# Patient Record
Sex: FEMALE | Race: WHITE | NOT HISPANIC OR LATINO
[De-identification: names, ages, dates, MRNs, and addresses within clinical notes are randomized per-mention and may not be internally consistent; named-entity substitution may affect disease eponyms.]

---

## 2017-11-16 PROBLEM — Z00.00 ENCOUNTER FOR PREVENTIVE HEALTH EXAMINATION: Status: ACTIVE | Noted: 2017-11-16

## 2017-11-20 ENCOUNTER — RECORD ABSTRACTING (OUTPATIENT)
Age: 82
End: 2017-11-20

## 2017-11-20 DIAGNOSIS — R91.1 SOLITARY PULMONARY NODULE: ICD-10-CM

## 2017-11-20 DIAGNOSIS — Z86.79 PERSONAL HISTORY OF OTHER DISEASES OF THE CIRCULATORY SYSTEM: ICD-10-CM

## 2017-11-20 DIAGNOSIS — F32.9 MAJOR DEPRESSIVE DISORDER, SINGLE EPISODE, UNSPECIFIED: ICD-10-CM

## 2017-11-20 DIAGNOSIS — E78.5 HYPERLIPIDEMIA, UNSPECIFIED: ICD-10-CM

## 2017-11-20 DIAGNOSIS — R53.83 OTHER FATIGUE: ICD-10-CM

## 2017-11-20 DIAGNOSIS — F41.9 ANXIETY DISORDER, UNSPECIFIED: ICD-10-CM

## 2017-11-20 DIAGNOSIS — D49.3 NEOPLASM OF UNSPECIFIED BEHAVIOR OF BREAST: ICD-10-CM

## 2017-11-20 DIAGNOSIS — R63.4 ABNORMAL WEIGHT LOSS: ICD-10-CM

## 2017-11-20 PROBLEM — I27.20 PULMONARY HYPERTENSION: Status: ACTIVE | Noted: 2017-11-20

## 2017-11-22 ENCOUNTER — APPOINTMENT (OUTPATIENT)
Dept: CARDIOTHORACIC SURGERY | Facility: CLINIC | Age: 82
End: 2017-11-22
Payer: MEDICARE

## 2017-11-22 ENCOUNTER — APPOINTMENT (OUTPATIENT)
Dept: CARDIOLOGY | Facility: CLINIC | Age: 82
End: 2017-11-22
Payer: MEDICARE

## 2017-11-22 VITALS
BODY MASS INDEX: 20.38 KG/M2 | HEIGHT: 63 IN | OXYGEN SATURATION: 96 % | HEART RATE: 81 BPM | WEIGHT: 115 LBS | SYSTOLIC BLOOD PRESSURE: 150 MMHG | DIASTOLIC BLOOD PRESSURE: 94 MMHG | RESPIRATION RATE: 16 BRPM

## 2017-11-22 DIAGNOSIS — I27.20 PULMONARY HYPERTENSION, UNSPECIFIED: ICD-10-CM

## 2017-11-22 DIAGNOSIS — Z87.891 PERSONAL HISTORY OF NICOTINE DEPENDENCE: ICD-10-CM

## 2017-11-22 PROCEDURE — 99205 OFFICE O/P NEW HI 60 MIN: CPT

## 2017-11-28 ENCOUNTER — FORM ENCOUNTER (OUTPATIENT)
Age: 82
End: 2017-11-28

## 2017-11-29 ENCOUNTER — TRANSCRIPTION ENCOUNTER (OUTPATIENT)
Age: 82
End: 2017-11-29

## 2017-11-29 ENCOUNTER — OUTPATIENT (OUTPATIENT)
Dept: OUTPATIENT SERVICES | Facility: HOSPITAL | Age: 82
LOS: 1 days | End: 2017-11-29
Payer: MEDICARE

## 2017-11-29 VITALS
DIASTOLIC BLOOD PRESSURE: 72 MMHG | RESPIRATION RATE: 16 BRPM | SYSTOLIC BLOOD PRESSURE: 156 MMHG | OXYGEN SATURATION: 98 % | HEART RATE: 71 BPM

## 2017-11-29 VITALS
OXYGEN SATURATION: 98 % | DIASTOLIC BLOOD PRESSURE: 92 MMHG | RESPIRATION RATE: 18 BRPM | SYSTOLIC BLOOD PRESSURE: 140 MMHG | TEMPERATURE: 98 F | HEART RATE: 76 BPM

## 2017-11-29 DIAGNOSIS — I34.8 OTHER NONRHEUMATIC MITRAL VALVE DISORDERS: ICD-10-CM

## 2017-11-29 DIAGNOSIS — Z98.890 OTHER SPECIFIED POSTPROCEDURAL STATES: Chronic | ICD-10-CM

## 2017-11-29 DIAGNOSIS — Z98.49 CATARACT EXTRACTION STATUS, UNSPECIFIED EYE: Chronic | ICD-10-CM

## 2017-11-29 DIAGNOSIS — I34.0 NONRHEUMATIC MITRAL (VALVE) INSUFFICIENCY: ICD-10-CM

## 2017-11-29 LAB
ANION GAP SERPL CALC-SCNC: 12 MMOL/L — SIGNIFICANT CHANGE UP (ref 5–17)
BUN SERPL-MCNC: 11 MG/DL — SIGNIFICANT CHANGE UP (ref 8–20)
CALCIUM SERPL-MCNC: 9.5 MG/DL — SIGNIFICANT CHANGE UP (ref 8.6–10.2)
CHLORIDE SERPL-SCNC: 100 MMOL/L — SIGNIFICANT CHANGE UP (ref 98–107)
CO2 SERPL-SCNC: 27 MMOL/L — SIGNIFICANT CHANGE UP (ref 22–29)
CREAT SERPL-MCNC: 0.66 MG/DL — SIGNIFICANT CHANGE UP (ref 0.5–1.3)
GLUCOSE SERPL-MCNC: 103 MG/DL — SIGNIFICANT CHANGE UP (ref 70–115)
HCT VFR BLD CALC: 48.3 % — HIGH (ref 37–47)
HGB BLD-MCNC: 16.2 G/DL — HIGH (ref 12–16)
INR BLD: 1.03 RATIO — SIGNIFICANT CHANGE UP (ref 0.88–1.16)
MCHC RBC-ENTMCNC: 30.4 PG — SIGNIFICANT CHANGE UP (ref 27–31)
MCHC RBC-ENTMCNC: 33.5 G/DL — SIGNIFICANT CHANGE UP (ref 32–36)
MCV RBC AUTO: 90.6 FL — SIGNIFICANT CHANGE UP (ref 81–99)
PLATELET # BLD AUTO: 132 K/UL — LOW (ref 150–400)
POTASSIUM SERPL-MCNC: 4.2 MMOL/L — SIGNIFICANT CHANGE UP (ref 3.5–5.3)
POTASSIUM SERPL-SCNC: 4.2 MMOL/L — SIGNIFICANT CHANGE UP (ref 3.5–5.3)
PROTHROM AB SERPL-ACNC: 11.3 SEC — SIGNIFICANT CHANGE UP (ref 9.8–12.7)
RBC # BLD: 5.33 M/UL — HIGH (ref 4.4–5.2)
RBC # FLD: 13.9 % — SIGNIFICANT CHANGE UP (ref 11–15.6)
SODIUM SERPL-SCNC: 139 MMOL/L — SIGNIFICANT CHANGE UP (ref 135–145)
WBC # BLD: 8.1 K/UL — SIGNIFICANT CHANGE UP (ref 4.8–10.8)
WBC # FLD AUTO: 8.1 K/UL — SIGNIFICANT CHANGE UP (ref 4.8–10.8)

## 2017-11-29 PROCEDURE — 93312 ECHO TRANSESOPHAGEAL: CPT | Mod: 26

## 2017-11-29 PROCEDURE — 93458 L HRT ARTERY/VENTRICLE ANGIO: CPT | Mod: 26

## 2017-11-29 PROCEDURE — 85027 COMPLETE CBC AUTOMATED: CPT

## 2017-11-29 PROCEDURE — 93320 DOPPLER ECHO COMPLETE: CPT | Mod: 26

## 2017-11-29 PROCEDURE — C1769: CPT

## 2017-11-29 PROCEDURE — 93312 ECHO TRANSESOPHAGEAL: CPT

## 2017-11-29 PROCEDURE — 99152 MOD SED SAME PHYS/QHP 5/>YRS: CPT

## 2017-11-29 PROCEDURE — 36415 COLL VENOUS BLD VENIPUNCTURE: CPT

## 2017-11-29 PROCEDURE — 71010: CPT | Mod: 26

## 2017-11-29 PROCEDURE — 76376 3D RENDER W/INTRP POSTPROCES: CPT | Mod: 26

## 2017-11-29 PROCEDURE — 93005 ELECTROCARDIOGRAM TRACING: CPT

## 2017-11-29 PROCEDURE — 80048 BASIC METABOLIC PNL TOTAL CA: CPT

## 2017-11-29 PROCEDURE — 93458 L HRT ARTERY/VENTRICLE ANGIO: CPT

## 2017-11-29 PROCEDURE — C1887: CPT

## 2017-11-29 PROCEDURE — 71045 X-RAY EXAM CHEST 1 VIEW: CPT

## 2017-11-29 PROCEDURE — 93325 DOPPLER ECHO COLOR FLOW MAPG: CPT | Mod: 26

## 2017-11-29 PROCEDURE — 85610 PROTHROMBIN TIME: CPT

## 2017-11-29 PROCEDURE — 93325 DOPPLER ECHO COLOR FLOW MAPG: CPT

## 2017-11-29 PROCEDURE — 93010 ELECTROCARDIOGRAM REPORT: CPT

## 2017-11-29 PROCEDURE — 93320 DOPPLER ECHO COMPLETE: CPT

## 2017-11-29 PROCEDURE — C1894: CPT

## 2017-11-29 RX ORDER — DIPHENHYDRAMINE HCL 50 MG
25 CAPSULE ORAL ONCE
Qty: 0 | Refills: 0 | Status: COMPLETED | OUTPATIENT
Start: 2017-11-29 | End: 2017-11-29

## 2017-11-29 RX ORDER — DEXAMETHASONE 0.5 MG/5ML
4 ELIXIR ORAL ONCE
Qty: 0 | Refills: 0 | Status: COMPLETED | OUTPATIENT
Start: 2017-11-29 | End: 2017-11-29

## 2017-11-29 RX ORDER — SODIUM CHLORIDE 9 MG/ML
1000 INJECTION INTRAMUSCULAR; INTRAVENOUS; SUBCUTANEOUS
Qty: 0 | Refills: 0 | Status: DISCONTINUED | OUTPATIENT
Start: 2017-11-29 | End: 2017-12-14

## 2017-11-29 RX ADMIN — Medication 4 MILLIGRAM(S): at 11:16

## 2017-11-29 RX ADMIN — Medication 25 MILLIGRAM(S): at 11:16

## 2017-11-29 RX ADMIN — SODIUM CHLORIDE 30 MILLILITER(S): 9 INJECTION INTRAMUSCULAR; INTRAVENOUS; SUBCUTANEOUS at 11:29

## 2017-11-29 NOTE — H&P PST ADULT - PSH
H/O breast surgery    History of bladder surgery    History of cataract surgery  bilateral  History of thyroid surgery

## 2017-11-29 NOTE — DISCHARGE NOTE ADULT - MEDICATION SUMMARY - MEDICATIONS TO TAKE
I will START or STAY ON the medications listed below when I get home from the hospital:    Valium 10 mg oral tablet  -- Indication: For Anxiety    simvastatin 40 mg oral tablet  -- 1 tab(s) by mouth once a day (at bedtime)  -- Indication: For Hyperlipidemia    metoprolol succinate 25 mg oral tablet, extended release  -- 1 tab(s) by mouth once a day  -- Indication: For Hypertension    Lasix 20 mg oral tablet  -- 1 tab(s) by mouth once a day  -- Indication: For Mitral valve regurgitation    HepaGam B injectable solution  -- 0.06 mL/kg injectable every 3 weeks  -- Indication: For Cancer

## 2017-11-29 NOTE — H&P PST ADULT - HISTORY OF PRESENT ILLNESS
83 yo female with h/o thyroid cancer, breast cancer, bladder cancer, all in remission and lung cancer with a right chest wall port in which she is presently on chemo every 21 days.  Patient also with h/o severe MR, hypertension, cataract surgery and COPD for PST for mitral clip with Dr Figueroa.  Patient here today for [facility] and [facility] pre-op testing for mitral clip.  Benadryl and decadron for shellfish allergy.

## 2017-11-29 NOTE — DISCHARGE NOTE ADULT - CARE PROVIDER_API CALL
Frantz Figueroa), Surgery; Thoracic and Cardiac Surgery  [address] [address]  Phone: [phone]  Fax: [phone]

## 2017-11-29 NOTE — DISCHARGE NOTE ADULT - NS AS ACTIVITY OBS
Stairs allowed/No Heavy lifting/straining/Walking-Outdoors allowed/Walking-Indoors allowed/Showering allowed/Do not make important decisions/Do not drive or operate machinery/Sex allowed

## 2017-11-29 NOTE — H&P PST ADULT - PMH
Anxiety    ASHD (arteriosclerotic heart disease)    Breast neoplasm    COPD (chronic obstructive pulmonary disease)    Depression    Fatigue    Hyperlipidemia    Hypertension    Mitral valve regurgitation    Pulmonary hypertension    PVD (peripheral vascular disease)    Tricuspid insufficiency

## 2017-11-29 NOTE — DISCHARGE NOTE ADULT - DISCHARGE DATE
29-Nov-2017 Walking-Indoors allowed/Bathing allowed/No Heavy lifting/straining/Walking-Outdoors allowed/Showering allowed/Driving allowed

## 2017-11-29 NOTE — DISCHARGE NOTE ADULT - PLAN OF CARE
ADl without SOB No heavy lifting, driving, sex, tub baths, swimming, or any activity that submerges the lower half of the body in water for 48 hours.  Limited walking and stairs for 48 hours.    Change the bandaid after 24 hours and every 24 hours after that.  Keep the puncture site dry and covered with a bandaid until a scab forms.    Observe the site frequently.  If bleeding or a large lump (the size of a golf ball or bigger) occurs lie flat, apply continuous direct pressure just above the puncture site for at least 10 minutes, and notify your physician immediately.  If the bleeding cannot be controlled, call 911 immediately for assistance.  Notify your physician of pain, swelling or any drainage.    Notify your physician immediately if coldness, numbness, discoloration or pain in your foot occurs.

## 2017-11-29 NOTE — DISCHARGE NOTE ADULT - PATIENT PORTAL LINK FT
“You can access the FollowHealth Patient Portal, offered by [facility], by registering with the following website: [email or web address]”

## 2017-11-29 NOTE — DISCHARGE NOTE ADULT - CARE PLAN
Principal Discharge DX:	Mitral regurgitation  Goal:	ADl without SOB  Instructions for follow-up, activity and diet:	No heavy lifting, driving, sex, tub baths, swimming, or any activity that submerges the lower half of the body in water for 48 hours.  Limited walking and stairs for 48 hours.    Change the bandaid after 24 hours and every 24 hours after that.  Keep the puncture site dry and covered with a bandaid until a scab forms.    Observe the site frequently.  If bleeding or a large lump (the size of a golf ball or bigger) occurs lie flat, apply continuous direct pressure just above the puncture site for at least 10 minutes, and notify your physician immediately.  If the bleeding cannot be controlled, call 911 immediately for assistance.  Notify your physician of pain, swelling or any drainage.    Notify your physician immediately if coldness, numbness, discoloration or pain in your foot occurs.

## 2017-12-13 ENCOUNTER — OUTPATIENT (OUTPATIENT)
Dept: OUTPATIENT SERVICES | Facility: HOSPITAL | Age: 82
LOS: 1 days | End: 2017-12-13
Payer: MEDICARE

## 2017-12-13 VITALS
HEART RATE: 64 BPM | RESPIRATION RATE: 20 BRPM | HEIGHT: 64 IN | DIASTOLIC BLOOD PRESSURE: 81 MMHG | TEMPERATURE: 97 F | WEIGHT: 116.84 LBS | SYSTOLIC BLOOD PRESSURE: 130 MMHG

## 2017-12-13 DIAGNOSIS — Z98.890 OTHER SPECIFIED POSTPROCEDURAL STATES: Chronic | ICD-10-CM

## 2017-12-13 DIAGNOSIS — Z01.818 ENCOUNTER FOR OTHER PREPROCEDURAL EXAMINATION: ICD-10-CM

## 2017-12-13 DIAGNOSIS — Z98.49 CATARACT EXTRACTION STATUS, UNSPECIFIED EYE: Chronic | ICD-10-CM

## 2017-12-13 DIAGNOSIS — I34.0 NONRHEUMATIC MITRAL (VALVE) INSUFFICIENCY: ICD-10-CM

## 2017-12-13 LAB
ALBUMIN SERPL ELPH-MCNC: 3.9 G/DL — SIGNIFICANT CHANGE UP (ref 3.3–5.2)
ALP SERPL-CCNC: 65 U/L — SIGNIFICANT CHANGE UP (ref 40–120)
ALT FLD-CCNC: 16 U/L — SIGNIFICANT CHANGE UP
ANION GAP SERPL CALC-SCNC: 11 MMOL/L — SIGNIFICANT CHANGE UP (ref 5–17)
APPEARANCE UR: CLEAR — SIGNIFICANT CHANGE UP
APTT BLD: 30.9 SEC — SIGNIFICANT CHANGE UP (ref 27.5–37.4)
AST SERPL-CCNC: 21 U/L — SIGNIFICANT CHANGE UP
BASOPHILS # BLD AUTO: 0 K/UL — SIGNIFICANT CHANGE UP (ref 0–0.2)
BASOPHILS NFR BLD AUTO: 0.5 % — SIGNIFICANT CHANGE UP (ref 0–2)
BILIRUB SERPL-MCNC: 0.4 MG/DL — SIGNIFICANT CHANGE UP (ref 0.4–2)
BILIRUB UR-MCNC: NEGATIVE — SIGNIFICANT CHANGE UP
BLD GP AB SCN SERPL QL: SIGNIFICANT CHANGE UP
BUN SERPL-MCNC: 15 MG/DL — SIGNIFICANT CHANGE UP (ref 8–20)
CALCIUM SERPL-MCNC: 9.4 MG/DL — SIGNIFICANT CHANGE UP (ref 8.6–10.2)
CHLORIDE SERPL-SCNC: 102 MMOL/L — SIGNIFICANT CHANGE UP (ref 98–107)
CO2 SERPL-SCNC: 27 MMOL/L — SIGNIFICANT CHANGE UP (ref 22–29)
COLOR SPEC: YELLOW — SIGNIFICANT CHANGE UP
CREAT SERPL-MCNC: 0.69 MG/DL — SIGNIFICANT CHANGE UP (ref 0.5–1.3)
DIFF PNL FLD: ABNORMAL
EOSINOPHIL # BLD AUTO: 0.2 K/UL — SIGNIFICANT CHANGE UP (ref 0–0.5)
EOSINOPHIL NFR BLD AUTO: 2.6 % — SIGNIFICANT CHANGE UP (ref 0–6)
EPI CELLS # UR: SIGNIFICANT CHANGE UP
GLUCOSE SERPL-MCNC: 95 MG/DL — SIGNIFICANT CHANGE UP (ref 70–115)
GLUCOSE UR QL: NEGATIVE MG/DL — SIGNIFICANT CHANGE UP
HBA1C BLD-MCNC: 5.4 % — SIGNIFICANT CHANGE UP (ref 4–5.6)
HCT VFR BLD CALC: 41.3 % — SIGNIFICANT CHANGE UP (ref 37–47)
HGB BLD-MCNC: 13.7 G/DL — SIGNIFICANT CHANGE UP (ref 12–16)
INR BLD: 1.04 RATIO — SIGNIFICANT CHANGE UP (ref 0.88–1.16)
KETONES UR-MCNC: NEGATIVE — SIGNIFICANT CHANGE UP
LEUKOCYTE ESTERASE UR-ACNC: ABNORMAL
LYMPHOCYTES # BLD AUTO: 2.2 K/UL — SIGNIFICANT CHANGE UP (ref 1–4.8)
LYMPHOCYTES # BLD AUTO: 27.2 % — SIGNIFICANT CHANGE UP (ref 20–55)
MAGNESIUM SERPL-MCNC: 2.1 MG/DL — SIGNIFICANT CHANGE UP (ref 1.6–2.6)
MCHC RBC-ENTMCNC: 30.2 PG — SIGNIFICANT CHANGE UP (ref 27–31)
MCHC RBC-ENTMCNC: 33.2 G/DL — SIGNIFICANT CHANGE UP (ref 32–36)
MCV RBC AUTO: 91 FL — SIGNIFICANT CHANGE UP (ref 81–99)
MONOCYTES # BLD AUTO: 0.4 K/UL — SIGNIFICANT CHANGE UP (ref 0–0.8)
MONOCYTES NFR BLD AUTO: 5.3 % — SIGNIFICANT CHANGE UP (ref 3–10)
MRSA PCR RESULT.: SIGNIFICANT CHANGE UP
NEUTROPHILS # BLD AUTO: 5.2 K/UL — SIGNIFICANT CHANGE UP (ref 1.8–8)
NEUTROPHILS NFR BLD AUTO: 64.3 % — SIGNIFICANT CHANGE UP (ref 37–73)
NITRITE UR-MCNC: NEGATIVE — SIGNIFICANT CHANGE UP
NT-PROBNP SERPL-SCNC: 2166 PG/ML — HIGH (ref 0–300)
PH UR: 7 — SIGNIFICANT CHANGE UP (ref 5–8)
PHOSPHATE SERPL-MCNC: 3.7 MG/DL — SIGNIFICANT CHANGE UP (ref 2.4–4.7)
PLATELET # BLD AUTO: 131 K/UL — LOW (ref 150–400)
POTASSIUM SERPL-MCNC: 4.3 MMOL/L — SIGNIFICANT CHANGE UP (ref 3.5–5.3)
POTASSIUM SERPL-SCNC: 4.3 MMOL/L — SIGNIFICANT CHANGE UP (ref 3.5–5.3)
PREALB SERPL-MCNC: 20 MG/DL — SIGNIFICANT CHANGE UP (ref 18–38)
PROT SERPL-MCNC: 6.6 G/DL — SIGNIFICANT CHANGE UP (ref 6.6–8.7)
PROT UR-MCNC: NEGATIVE MG/DL — SIGNIFICANT CHANGE UP
PROTHROM AB SERPL-ACNC: 11.5 SEC — SIGNIFICANT CHANGE UP (ref 9.8–12.7)
RBC # BLD: 4.54 M/UL — SIGNIFICANT CHANGE UP (ref 4.4–5.2)
RBC # FLD: 13.4 % — SIGNIFICANT CHANGE UP (ref 11–15.6)
RBC CASTS # UR COMP ASSIST: SIGNIFICANT CHANGE UP /HPF (ref 0–4)
S AUREUS DNA NOSE QL NAA+PROBE: DETECTED
SODIUM SERPL-SCNC: 140 MMOL/L — SIGNIFICANT CHANGE UP (ref 135–145)
SP GR SPEC: 1.01 — SIGNIFICANT CHANGE UP (ref 1.01–1.02)
T3 SERPL-MCNC: 101 NG/DL — SIGNIFICANT CHANGE UP (ref 80–200)
T4 AB SER-ACNC: 5.2 UG/DL — SIGNIFICANT CHANGE UP (ref 4.5–12)
TSH SERPL-MCNC: 4.04 UIU/ML — SIGNIFICANT CHANGE UP (ref 0.27–4.2)
TYPE + AB SCN PNL BLD: SIGNIFICANT CHANGE UP
UROBILINOGEN FLD QL: NEGATIVE MG/DL — SIGNIFICANT CHANGE UP
WBC # BLD: 8.1 K/UL — SIGNIFICANT CHANGE UP (ref 4.8–10.8)
WBC # FLD AUTO: 8.1 K/UL — SIGNIFICANT CHANGE UP (ref 4.8–10.8)
WBC UR QL: SIGNIFICANT CHANGE UP

## 2017-12-13 PROCEDURE — 71020: CPT | Mod: 26

## 2017-12-13 PROCEDURE — 93010 ELECTROCARDIOGRAM REPORT: CPT

## 2017-12-13 RX ORDER — FUROSEMIDE 40 MG
1 TABLET ORAL
Qty: 0 | Refills: 0 | COMMUNITY

## 2017-12-13 NOTE — H&P PST ADULT - HISTORY OF PRESENT ILLNESS
85 yo female with h/o thyroid cancer, breast cancer, bladder cancer, all in remission and lung cancer with a right chest wall port in which she is presently on chemo every 21 days.  Patient also with h/o severe MR, hypertension, cataract surgery and COPD for PST for mitral clip with Dr Figueroa.

## 2017-12-13 NOTE — H&P PST ADULT - PSH
H/O breast surgery    History of bladder surgery    History of cataract surgery  bilateral  History of thyroid surgery    S/P Mohs surgery for basal cell carcinoma

## 2017-12-13 NOTE — H&P PST ADULT - PMH
Anxiety    ASHD (arteriosclerotic heart disease)    Basal cell carcinoma    Bladder cancer    Breast neoplasm    COPD (chronic obstructive pulmonary disease)    Fatigue    Glaucoma    Hyperlipidemia    Hypertension    Lung cancer    Mitral valve regurgitation    Pulmonary hypertension    PVD (peripheral vascular disease)    Thyroid ca    Tricuspid insufficiency

## 2017-12-13 NOTE — H&P PST ADULT - NSANTHOSAYNRD_GEN_A_CORE
No. ARLENE screening performed.  STOP BANG Legend: 0-2 = LOW Risk; 3-4 = INTERMEDIATE Risk; 5-8 = HIGH Risk

## 2017-12-13 NOTE — H&P PST ADULT - ASSESSMENT
83 yo female with h/o thyroid cancer, breast cancer, bladder cancer, all in remission and lung cancer with a right chest wall port in which she is presently on chemo every 21 days.  Patient also with h/o severe MR, hypertension, cataract surgery and COPD for PST for mitral clip with Dr Figueroa.

## 2017-12-13 NOTE — PATIENT PROFILE ADULT. - LEARNING ASSESSMENT (PATIENT) ADDITIONAL COMMENTS
Instructed pt on pre-op instructions, tips for safer surgery, pain management scale, pre-surgical infection prevention instructions, MRSA/MSSA instructions, and verbalized understanding of all.

## 2017-12-13 NOTE — PATIENT PROFILE ADULT. - VISION (WITH CORRECTIVE LENSES IF THE PATIENT USUALLY WEARS THEM):
Partially impaired: cannot see medication labels or newsprint, but can see obstacles in path, and the surrounding layout; can count fingers at arm's length/glasses - bifocals

## 2017-12-14 LAB
CULTURE RESULTS: SIGNIFICANT CHANGE UP
SPECIMEN SOURCE: SIGNIFICANT CHANGE UP

## 2017-12-19 PROCEDURE — 93005 ELECTROCARDIOGRAM TRACING: CPT

## 2017-12-19 PROCEDURE — 85610 PROTHROMBIN TIME: CPT

## 2017-12-19 PROCEDURE — 86900 BLOOD TYPING SEROLOGIC ABO: CPT

## 2017-12-19 PROCEDURE — 84134 ASSAY OF PREALBUMIN: CPT

## 2017-12-19 PROCEDURE — 83036 HEMOGLOBIN GLYCOSYLATED A1C: CPT

## 2017-12-19 PROCEDURE — 87641 MR-STAPH DNA AMP PROBE: CPT

## 2017-12-19 PROCEDURE — 83880 ASSAY OF NATRIURETIC PEPTIDE: CPT

## 2017-12-19 PROCEDURE — 71046 X-RAY EXAM CHEST 2 VIEWS: CPT

## 2017-12-19 PROCEDURE — 83735 ASSAY OF MAGNESIUM: CPT

## 2017-12-19 PROCEDURE — 84480 ASSAY TRIIODOTHYRONINE (T3): CPT

## 2017-12-19 PROCEDURE — G0463: CPT

## 2017-12-19 PROCEDURE — 87086 URINE CULTURE/COLONY COUNT: CPT

## 2017-12-19 PROCEDURE — 84443 ASSAY THYROID STIM HORMONE: CPT

## 2017-12-19 PROCEDURE — 36415 COLL VENOUS BLD VENIPUNCTURE: CPT

## 2017-12-19 PROCEDURE — 80053 COMPREHEN METABOLIC PANEL: CPT

## 2017-12-19 PROCEDURE — 84100 ASSAY OF PHOSPHORUS: CPT

## 2017-12-19 PROCEDURE — 86901 BLOOD TYPING SEROLOGIC RH(D): CPT

## 2017-12-19 PROCEDURE — 87640 STAPH A DNA AMP PROBE: CPT

## 2017-12-19 PROCEDURE — 85730 THROMBOPLASTIN TIME PARTIAL: CPT

## 2017-12-19 PROCEDURE — 86920 COMPATIBILITY TEST SPIN: CPT

## 2017-12-19 PROCEDURE — 84436 ASSAY OF TOTAL THYROXINE: CPT

## 2017-12-19 PROCEDURE — 86850 RBC ANTIBODY SCREEN: CPT

## 2017-12-19 PROCEDURE — 85027 COMPLETE CBC AUTOMATED: CPT

## 2017-12-19 PROCEDURE — 81001 URINALYSIS AUTO W/SCOPE: CPT

## 2017-12-20 ENCOUNTER — INPATIENT (INPATIENT)
Facility: HOSPITAL | Age: 82
LOS: 0 days | Discharge: HOME | DRG: 229 | End: 2017-12-21
Attending: THORACIC SURGERY (CARDIOTHORACIC VASCULAR SURGERY) | Admitting: THORACIC SURGERY (CARDIOTHORACIC VASCULAR SURGERY)
Payer: MEDICARE

## 2017-12-20 ENCOUNTER — APPOINTMENT (OUTPATIENT)
Dept: CARDIOTHORACIC SURGERY | Facility: HOSPITAL | Age: 82
End: 2017-12-20
Payer: MEDICARE

## 2017-12-20 VITALS
RESPIRATION RATE: 16 BRPM | WEIGHT: 115.08 LBS | OXYGEN SATURATION: 100 % | TEMPERATURE: 98 F | DIASTOLIC BLOOD PRESSURE: 87 MMHG | SYSTOLIC BLOOD PRESSURE: 142 MMHG | HEIGHT: 63 IN | HEART RATE: 73 BPM

## 2017-12-20 DIAGNOSIS — Z98.890 OTHER SPECIFIED POSTPROCEDURAL STATES: Chronic | ICD-10-CM

## 2017-12-20 DIAGNOSIS — Z98.49 CATARACT EXTRACTION STATUS, UNSPECIFIED EYE: Chronic | ICD-10-CM

## 2017-12-20 DIAGNOSIS — I34.0 NONRHEUMATIC MITRAL (VALVE) INSUFFICIENCY: ICD-10-CM

## 2017-12-20 LAB
ALBUMIN SERPL ELPH-MCNC: 3.5 G/DL — SIGNIFICANT CHANGE UP (ref 3.3–5.2)
ALP SERPL-CCNC: 66 U/L — SIGNIFICANT CHANGE UP (ref 40–120)
ALT FLD-CCNC: 13 U/L — SIGNIFICANT CHANGE UP
ANION GAP SERPL CALC-SCNC: 14 MMOL/L — SIGNIFICANT CHANGE UP (ref 5–17)
APTT BLD: 32.9 SEC — SIGNIFICANT CHANGE UP (ref 27.5–37.4)
AST SERPL-CCNC: 20 U/L — SIGNIFICANT CHANGE UP
BILIRUB SERPL-MCNC: 0.5 MG/DL — SIGNIFICANT CHANGE UP (ref 0.4–2)
BLD GP AB SCN SERPL QL: SIGNIFICANT CHANGE UP
BUN SERPL-MCNC: 12 MG/DL — SIGNIFICANT CHANGE UP (ref 8–20)
CALCIUM SERPL-MCNC: 8.5 MG/DL — LOW (ref 8.6–10.2)
CHLORIDE SERPL-SCNC: 102 MMOL/L — SIGNIFICANT CHANGE UP (ref 98–107)
CK SERPL-CCNC: 36 U/L — SIGNIFICANT CHANGE UP (ref 25–170)
CO2 SERPL-SCNC: 24 MMOL/L — SIGNIFICANT CHANGE UP (ref 22–29)
CREAT SERPL-MCNC: 0.51 MG/DL — SIGNIFICANT CHANGE UP (ref 0.5–1.3)
GAS PNL BLDA: SIGNIFICANT CHANGE UP
GLUCOSE BLDC GLUCOMTR-MCNC: 93 MG/DL — SIGNIFICANT CHANGE UP (ref 70–99)
GLUCOSE SERPL-MCNC: 127 MG/DL — HIGH (ref 70–115)
HCT VFR BLD CALC: 39.2 % — SIGNIFICANT CHANGE UP (ref 37–47)
HGB BLD-MCNC: 13.3 G/DL — SIGNIFICANT CHANGE UP (ref 12–16)
INR BLD: 1.12 RATIO — SIGNIFICANT CHANGE UP (ref 0.88–1.16)
MAGNESIUM SERPL-MCNC: 1.8 MG/DL — SIGNIFICANT CHANGE UP (ref 1.6–2.6)
MCHC RBC-ENTMCNC: 30.5 PG — SIGNIFICANT CHANGE UP (ref 27–31)
MCHC RBC-ENTMCNC: 33.9 G/DL — SIGNIFICANT CHANGE UP (ref 32–36)
MCV RBC AUTO: 89.9 FL — SIGNIFICANT CHANGE UP (ref 81–99)
PLATELET # BLD AUTO: 105 K/UL — LOW (ref 150–400)
POTASSIUM SERPL-MCNC: 3.6 MMOL/L — SIGNIFICANT CHANGE UP (ref 3.5–5.3)
POTASSIUM SERPL-SCNC: 3.6 MMOL/L — SIGNIFICANT CHANGE UP (ref 3.5–5.3)
PROT SERPL-MCNC: 6.1 G/DL — LOW (ref 6.6–8.7)
PROTHROM AB SERPL-ACNC: 12.3 SEC — SIGNIFICANT CHANGE UP (ref 9.8–12.7)
RBC # BLD: 4.36 M/UL — LOW (ref 4.4–5.2)
RBC # FLD: 13.4 % — SIGNIFICANT CHANGE UP (ref 11–15.6)
SODIUM SERPL-SCNC: 140 MMOL/L — SIGNIFICANT CHANGE UP (ref 135–145)
TROPONIN T SERPL-MCNC: 0.04 NG/ML — SIGNIFICANT CHANGE UP (ref 0–0.06)
TYPE + AB SCN PNL BLD: SIGNIFICANT CHANGE UP
WBC # BLD: 6.6 K/UL — SIGNIFICANT CHANGE UP (ref 4.8–10.8)
WBC # FLD AUTO: 6.6 K/UL — SIGNIFICANT CHANGE UP (ref 4.8–10.8)

## 2017-12-20 PROCEDURE — 33418 REPAIR TCAT MITRAL VALVE: CPT | Mod: 62,Q0

## 2017-12-20 PROCEDURE — 93010 ELECTROCARDIOGRAM REPORT: CPT

## 2017-12-20 PROCEDURE — 33419 REPAIR TCAT MITRAL VALVE: CPT | Mod: 62,Q0

## 2017-12-20 PROCEDURE — 33419 REPAIR TCAT MITRAL VALVE: CPT | Mod: AS,Q0

## 2017-12-20 PROCEDURE — 93355 ECHO TRANSESOPHAGEAL (TEE): CPT

## 2017-12-20 RX ORDER — METOPROLOL TARTRATE 50 MG
25 TABLET ORAL DAILY
Qty: 0 | Refills: 0 | Status: DISCONTINUED | OUTPATIENT
Start: 2017-12-21 | End: 2017-12-21

## 2017-12-20 RX ORDER — SIMVASTATIN 20 MG/1
40 TABLET, FILM COATED ORAL AT BEDTIME
Qty: 0 | Refills: 0 | Status: DISCONTINUED | OUTPATIENT
Start: 2017-12-20 | End: 2017-12-21

## 2017-12-20 RX ORDER — SODIUM CHLORIDE 9 MG/ML
1000 INJECTION INTRAMUSCULAR; INTRAVENOUS; SUBCUTANEOUS
Qty: 0 | Refills: 0 | Status: DISCONTINUED | OUTPATIENT
Start: 2017-12-20 | End: 2017-12-21

## 2017-12-20 RX ORDER — ASPIRIN/CALCIUM CARB/MAGNESIUM 324 MG
325 TABLET ORAL DAILY
Qty: 0 | Refills: 0 | Status: DISCONTINUED | OUTPATIENT
Start: 2017-12-21 | End: 2017-12-21

## 2017-12-20 RX ORDER — BRIMONIDINE TARTRATE 2 MG/MG
1 SOLUTION/ DROPS OPHTHALMIC
Qty: 0 | Refills: 0 | Status: DISCONTINUED | OUTPATIENT
Start: 2017-12-20 | End: 2017-12-21

## 2017-12-20 RX ORDER — CEFUROXIME AXETIL 250 MG
1500 TABLET ORAL EVERY 8 HOURS
Qty: 0 | Refills: 0 | Status: DISCONTINUED | OUTPATIENT
Start: 2017-12-20 | End: 2017-12-21

## 2017-12-20 RX ORDER — POTASSIUM CHLORIDE 20 MEQ
10 PACKET (EA) ORAL
Qty: 0 | Refills: 0 | Status: COMPLETED | OUTPATIENT
Start: 2017-12-20 | End: 2017-12-20

## 2017-12-20 RX ORDER — LATANOPROST 0.05 MG/ML
1 SOLUTION/ DROPS OPHTHALMIC; TOPICAL AT BEDTIME
Qty: 0 | Refills: 0 | Status: DISCONTINUED | OUTPATIENT
Start: 2017-12-20 | End: 2017-12-21

## 2017-12-20 RX ORDER — SODIUM CHLORIDE 9 MG/ML
3 INJECTION INTRAMUSCULAR; INTRAVENOUS; SUBCUTANEOUS EVERY 8 HOURS
Qty: 0 | Refills: 0 | Status: DISCONTINUED | OUTPATIENT
Start: 2017-12-20 | End: 2017-12-20

## 2017-12-20 RX ORDER — CEFUROXIME AXETIL 250 MG
1500 TABLET ORAL ONCE
Qty: 0 | Refills: 0 | Status: DISCONTINUED | OUTPATIENT
Start: 2017-12-20 | End: 2017-12-20

## 2017-12-20 RX ORDER — ASPIRIN/CALCIUM CARB/MAGNESIUM 324 MG
325 TABLET ORAL ONCE
Qty: 0 | Refills: 0 | Status: COMPLETED | OUTPATIENT
Start: 2017-12-20 | End: 2017-12-20

## 2017-12-20 RX ORDER — POTASSIUM CHLORIDE 20 MEQ
40 PACKET (EA) ORAL ONCE
Qty: 0 | Refills: 0 | Status: COMPLETED | OUTPATIENT
Start: 2017-12-20 | End: 2017-12-20

## 2017-12-20 RX ORDER — FUROSEMIDE 40 MG
40 TABLET ORAL DAILY
Qty: 0 | Refills: 0 | Status: DISCONTINUED | OUTPATIENT
Start: 2017-12-21 | End: 2017-12-21

## 2017-12-20 RX ORDER — CHLORHEXIDINE GLUCONATE 213 G/1000ML
5 SOLUTION TOPICAL EVERY 4 HOURS
Qty: 0 | Refills: 0 | Status: DISCONTINUED | OUTPATIENT
Start: 2017-12-20 | End: 2017-12-20

## 2017-12-20 RX ORDER — SODIUM CHLORIDE 9 MG/ML
1000 INJECTION INTRAMUSCULAR; INTRAVENOUS; SUBCUTANEOUS
Qty: 0 | Refills: 0 | Status: DISCONTINUED | OUTPATIENT
Start: 2017-12-20 | End: 2017-12-20

## 2017-12-20 RX ORDER — MAGNESIUM SULFATE 500 MG/ML
2 VIAL (ML) INJECTION ONCE
Qty: 0 | Refills: 0 | Status: COMPLETED | OUTPATIENT
Start: 2017-12-20 | End: 2017-12-20

## 2017-12-20 RX ADMIN — LATANOPROST 1 DROP(S): 0.05 SOLUTION/ DROPS OPHTHALMIC; TOPICAL at 21:52

## 2017-12-20 RX ADMIN — Medication 100 MILLIGRAM(S): at 20:37

## 2017-12-20 RX ADMIN — Medication 100 MILLIEQUIVALENT(S): at 21:58

## 2017-12-20 RX ADMIN — SIMVASTATIN 40 MILLIGRAM(S): 20 TABLET, FILM COATED ORAL at 21:52

## 2017-12-20 RX ADMIN — Medication 50 GRAM(S): at 18:39

## 2017-12-20 RX ADMIN — BRIMONIDINE TARTRATE 1 DROP(S): 2 SOLUTION/ DROPS OPHTHALMIC at 18:32

## 2017-12-20 RX ADMIN — Medication 100 MILLIEQUIVALENT(S): at 18:45

## 2017-12-20 RX ADMIN — Medication 325 MILLIGRAM(S): at 21:52

## 2017-12-20 RX ADMIN — Medication 40 MILLIEQUIVALENT(S): at 19:49

## 2017-12-20 RX ADMIN — Medication 100 MILLIEQUIVALENT(S): at 20:37

## 2017-12-20 NOTE — BRIEF OPERATIVE NOTE - PROCEDURE
<<-----Click on this checkbox to enter Procedure Percutaneous mitral valve repair with leaflet clip implant  12/20/2017  Percutaneous Mitral Valve Repair Utilizing the Mitral Clip via Right Common Femoral Vein (NCT# [id number]) (STS/ACC TVT Registry Patient ID# [id number])  Active  [id number]

## 2017-12-20 NOTE — PROGRESS NOTE ADULT - SUBJECTIVE AND OBJECTIVE BOX
Patient is enrolled in the STS/ACC TVT Registry Mitral Module (TMVR) clinical trials number [id number]

## 2017-12-20 NOTE — PROGRESS NOTE ADULT - SUBJECTIVE AND OBJECTIVE BOX
[employer] Percutaneous Mitral Valve Repair Utilizing the Mitral Clip via Right Common Femoral Vein.  NCT# [id number], STS/ACC TVT Registry Patient ID# [id number].

## 2017-12-20 NOTE — BRIEF OPERATIVE NOTE - POST-OP DX
Chronic diastolic CHF (congestive heart failure), NYHA class 4  12/20/2017    Active  Lm Wise  Severe mitral regurgitation  12/20/2017    Active  Lm Wise

## 2017-12-21 VITALS
DIASTOLIC BLOOD PRESSURE: 65 MMHG | OXYGEN SATURATION: 98 % | HEART RATE: 72 BPM | RESPIRATION RATE: 25 BRPM | SYSTOLIC BLOOD PRESSURE: 144 MMHG

## 2017-12-21 DIAGNOSIS — H40.9 UNSPECIFIED GLAUCOMA: ICD-10-CM

## 2017-12-21 DIAGNOSIS — I50.32 CHRONIC DIASTOLIC (CONGESTIVE) HEART FAILURE: ICD-10-CM

## 2017-12-21 DIAGNOSIS — E78.5 HYPERLIPIDEMIA, UNSPECIFIED: ICD-10-CM

## 2017-12-21 LAB
ANION GAP SERPL CALC-SCNC: 13 MMOL/L — SIGNIFICANT CHANGE UP (ref 5–17)
BUN SERPL-MCNC: 12 MG/DL — SIGNIFICANT CHANGE UP (ref 8–20)
CALCIUM SERPL-MCNC: 8.4 MG/DL — LOW (ref 8.6–10.2)
CHLORIDE SERPL-SCNC: 104 MMOL/L — SIGNIFICANT CHANGE UP (ref 98–107)
CK SERPL-CCNC: 33 U/L — SIGNIFICANT CHANGE UP (ref 25–170)
CO2 SERPL-SCNC: 22 MMOL/L — SIGNIFICANT CHANGE UP (ref 22–29)
CREAT SERPL-MCNC: 0.52 MG/DL — SIGNIFICANT CHANGE UP (ref 0.5–1.3)
GLUCOSE SERPL-MCNC: 112 MG/DL — SIGNIFICANT CHANGE UP (ref 70–115)
HCT VFR BLD CALC: 38.1 % — SIGNIFICANT CHANGE UP (ref 37–47)
HGB BLD-MCNC: 12.9 G/DL — SIGNIFICANT CHANGE UP (ref 12–16)
MAGNESIUM SERPL-MCNC: 2.2 MG/DL — SIGNIFICANT CHANGE UP (ref 1.8–2.6)
MCHC RBC-ENTMCNC: 30.4 PG — SIGNIFICANT CHANGE UP (ref 27–31)
MCHC RBC-ENTMCNC: 33.9 G/DL — SIGNIFICANT CHANGE UP (ref 32–36)
MCV RBC AUTO: 89.9 FL — SIGNIFICANT CHANGE UP (ref 81–99)
PLATELET # BLD AUTO: 104 K/UL — LOW (ref 150–400)
POTASSIUM SERPL-MCNC: 4.3 MMOL/L — SIGNIFICANT CHANGE UP (ref 3.5–5.3)
POTASSIUM SERPL-SCNC: 4.3 MMOL/L — SIGNIFICANT CHANGE UP (ref 3.5–5.3)
RBC # BLD: 4.24 M/UL — LOW (ref 4.4–5.2)
RBC # FLD: 13.5 % — SIGNIFICANT CHANGE UP (ref 11–15.6)
SODIUM SERPL-SCNC: 139 MMOL/L — SIGNIFICANT CHANGE UP (ref 135–145)
TROPONIN T SERPL-MCNC: 0.03 NG/ML — SIGNIFICANT CHANGE UP (ref 0–0.06)
WBC # BLD: 8.3 K/UL — SIGNIFICANT CHANGE UP (ref 4.8–10.8)
WBC # FLD AUTO: 8.3 K/UL — SIGNIFICANT CHANGE UP (ref 4.8–10.8)

## 2017-12-21 PROCEDURE — 71010: CPT | Mod: 26

## 2017-12-21 PROCEDURE — 86901 BLOOD TYPING SEROLOGIC RH(D): CPT

## 2017-12-21 PROCEDURE — 85730 THROMBOPLASTIN TIME PARTIAL: CPT

## 2017-12-21 PROCEDURE — 80048 BASIC METABOLIC PNL TOTAL CA: CPT

## 2017-12-21 PROCEDURE — 97163 PT EVAL HIGH COMPLEX 45 MIN: CPT

## 2017-12-21 PROCEDURE — C1889: CPT

## 2017-12-21 PROCEDURE — 82550 ASSAY OF CK (CPK): CPT

## 2017-12-21 PROCEDURE — 84132 ASSAY OF SERUM POTASSIUM: CPT

## 2017-12-21 PROCEDURE — 36415 COLL VENOUS BLD VENIPUNCTURE: CPT

## 2017-12-21 PROCEDURE — 82803 BLOOD GASES ANY COMBINATION: CPT

## 2017-12-21 PROCEDURE — 82330 ASSAY OF CALCIUM: CPT

## 2017-12-21 PROCEDURE — 86850 RBC ANTIBODY SCREEN: CPT

## 2017-12-21 PROCEDURE — 93005 ELECTROCARDIOGRAM TRACING: CPT

## 2017-12-21 PROCEDURE — 80053 COMPREHEN METABOLIC PANEL: CPT

## 2017-12-21 PROCEDURE — 93312 ECHO TRANSESOPHAGEAL: CPT

## 2017-12-21 PROCEDURE — 93306 TTE W/DOPPLER COMPLETE: CPT | Mod: 26

## 2017-12-21 PROCEDURE — 71045 X-RAY EXAM CHEST 1 VIEW: CPT

## 2017-12-21 PROCEDURE — 83605 ASSAY OF LACTIC ACID: CPT

## 2017-12-21 PROCEDURE — 93306 TTE W/DOPPLER COMPLETE: CPT

## 2017-12-21 PROCEDURE — 83735 ASSAY OF MAGNESIUM: CPT

## 2017-12-21 PROCEDURE — 85027 COMPLETE CBC AUTOMATED: CPT

## 2017-12-21 PROCEDURE — 82435 ASSAY OF BLOOD CHLORIDE: CPT

## 2017-12-21 PROCEDURE — 85610 PROTHROMBIN TIME: CPT

## 2017-12-21 PROCEDURE — 86900 BLOOD TYPING SEROLOGIC ABO: CPT

## 2017-12-21 PROCEDURE — 84484 ASSAY OF TROPONIN QUANT: CPT

## 2017-12-21 PROCEDURE — 84295 ASSAY OF SERUM SODIUM: CPT

## 2017-12-21 PROCEDURE — 93325 DOPPLER ECHO COLOR FLOW MAPG: CPT

## 2017-12-21 PROCEDURE — 93010 ELECTROCARDIOGRAM REPORT: CPT

## 2017-12-21 PROCEDURE — 86920 COMPATIBILITY TEST SPIN: CPT

## 2017-12-21 PROCEDURE — 82947 ASSAY GLUCOSE BLOOD QUANT: CPT

## 2017-12-21 PROCEDURE — 82962 GLUCOSE BLOOD TEST: CPT

## 2017-12-21 PROCEDURE — 93320 DOPPLER ECHO COMPLETE: CPT

## 2017-12-21 PROCEDURE — 85014 HEMATOCRIT: CPT

## 2017-12-21 RX ORDER — ASPIRIN/CALCIUM CARB/MAGNESIUM 324 MG
1 TABLET ORAL
Qty: 90 | Refills: 0
Start: 2017-12-21 | End: 2018-03-20

## 2017-12-21 RX ADMIN — Medication 40 MILLIGRAM(S): at 06:49

## 2017-12-21 RX ADMIN — BRIMONIDINE TARTRATE 1 DROP(S): 2 SOLUTION/ DROPS OPHTHALMIC at 17:48

## 2017-12-21 RX ADMIN — Medication 100 MILLIGRAM(S): at 11:43

## 2017-12-21 RX ADMIN — Medication 100 MILLIGRAM(S): at 04:00

## 2017-12-21 RX ADMIN — BRIMONIDINE TARTRATE 1 DROP(S): 2 SOLUTION/ DROPS OPHTHALMIC at 06:49

## 2017-12-21 RX ADMIN — Medication 325 MILLIGRAM(S): at 11:43

## 2017-12-21 RX ADMIN — Medication 25 MILLIGRAM(S): at 08:25

## 2017-12-21 NOTE — DISCHARGE NOTE ADULT - OTHER SIGNIFICANT FINDINGS
Transthoracic Echocardiogram:   1. Left ventricular ejection fraction, by visual estimation, is 45%.   2. Mildly to moderately decreased global left ventricular systolic   function.   3. Spectral Doppler shows pseudonormal pattern of left ventricular   myocardial filling   4. Normal right ventricular size and function.   5. Severely enlarged left atrium.   6. Severely dilated right atrium.   7. Sclerotic aortic valve with normal opening.   8. Mild aortic regurgitation.   9. Patient is s/p mitral clip with at least moderate anteriorly directed   regurgitation.  10. Mitral valve mean gradient is 4.1 mmHg consistent with mild mitral   stenosis.  11. Moderate mitral annular calcification.  12. Mild-moderate tricuspid regurgitation.  13. There is no evidence of pericardial effusion.

## 2017-12-21 NOTE — DISCHARGE NOTE ADULT - CARE PROVIDER_API CALL
Frantz Figueroa), Surgery; Thoracic and Cardiac Surgery  [address] [address]  Phone: [phone]  Fax: [phone]

## 2017-12-21 NOTE — DISCHARGE NOTE ADULT - PATIENT PORTAL LINK FT
“You can access the FollowHealth Patient Portal, offered by [facility], by registering with the following website: [email or web address]”

## 2017-12-21 NOTE — PROGRESS NOTE ADULT - ASSESSMENT
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

## 2017-12-21 NOTE — PROGRESS NOTE ADULT - PROBLEM SELECTOR PLAN 1
s/p mitral clip  neurologically intact  hemodynamically stable  continue aspirin (started last night)  extubated yesterday in OR, oxygenating well, C+DBE/IS instructed and encouraged  dysphagia screen passed, diet as tolerated  replete electrolytes PRN   OOB/ambulate as tolerated and PT consult this morning  acetaminophen PRN for analgesia  if TTE without effusion and pt remains stable she can likely be d/c'd today  above plan of care to be discussed with CT surgical team on am rounds  continue post-op GI, DVT, abx prophylaxis s/p mitral clip  neurologically intact  hemodynamically stable  continue aspirin (started last night)  d/c right groin stitch prior to discharge  extubated yesterday in OR, oxygenating well, C+DBE/IS instructed and encouraged  dysphagia screen passed, diet as tolerated  replete electrolytes PRN   OOB/ambulate as tolerated and PT consult this morning  acetaminophen PRN for analgesia  if TTE without effusion and pt remains stable she can likely be d/c'd today  above plan of care to be discussed with CT surgical team on am rounds  continue post-op GI, DVT, abx prophylaxis

## 2017-12-21 NOTE — PROGRESS NOTE ADULT - SUBJECTIVE AND OBJECTIVE BOX
Subjective: right groin is sore when touched. Denies CP, SOB, palpitations, dizziness, N/V, other c/o.    T(C): 36.4 (12-20-17 @ 19:00), Max: 36.9 (12-20-17 @ 11:21)  HR: 51 (12-21-17 @ 00:00) (50 - 73), SB  BP: 142/87 (12-20-17 @ 11:21) (142/87 - 142/87)  ABP: 122/49 (12-21-17 @ 00:00) (122/49 - 165/63)  ABP(mean): 74 (12-21-17 @ 00:00) (74 - 105)  RR: 15 (12-21-17 @ 00:00) (0 - 24)  SpO2: 95% (12-21-17 @ 00:00) (95% - 100%) on room air      I&O's Detail    20 Dec 2017 07:01  -  21 Dec 2017 00:15  --------------------------------------------------------  IN:    Oral Fluid: 480 mL    sodium chloride 0.9%.: 25 mL    Solution: 100 mL    Solution: 50 mL    Solution: 50 mL  Total IN: 705 mL    OUT:    Voided: 550 mL  Total OUT: 550 mL    Total NET: 155 mL      12-20    140  |  102  |  12.0  ----------------------------<  127<H>  3.6   |  24.0  |  0.51    Ca    8.5<L>      20 Dec 2017 17:21  Mg     1.8     12-20  TPro  6.1<L>  /  Alb  3.5  /  TBili  0.5  /  DBili  x   /  AST  20  /  ALT  13  /  AlkPhos  66  12-20                       13.3   6.6   )-----------( 105      ( 20 Dec 2017 17:21 )             39.2     PT/INR - ( 20 Dec 2017 17:21 )   PT: 12.3 sec;   INR: 1.12 ratio    PTT - ( 20 Dec 2017 17:21 )  PTT:32.9 sec    ABG - ( 20 Dec 2017 17:03 )  pH: 7.37  /  pCO2: 43    /  pO2: 167   / HCO3: 24    / Base Excess: -0.8  /  SaO2: 100       CARDIAC MARKERS ( 20 Dec 2017 17:21 )  CK36 U/L / CKMBx     / Troponin T0.04 ng/mL /    POCT Blood Glucose.: 93 mg/dL (20 Dec 2017 12:02)      12/20/17 CXR (my read): s/p mitral clip, no PTX or opacity, mild cephalization, right port in SVC  12/21/17 CXR: pending    MEDICATIONS  (STANDING):  aspirin enteric coated 325 milliGRAM(s) Oral daily  brimonidine 0.2% Ophthalmic Solution 1 Drop(s) Both EYES two times a day  cefuroxime  IVPB 1500 milliGRAM(s) IV Intermittent every 8 hours  furosemide    Tablet 40 milliGRAM(s) Oral daily  latanoprost 0.005% Ophthalmic Solution 1 Drop(s) Both EYES at bedtime  metoprolol succinate ER 25 milliGRAM(s) Oral daily  simvastatin 40 milliGRAM(s) Oral at bedtime  sodium chloride 0.9%. 1000 milliLiter(s) (5 mL/Hr) IV Continuous <Continuous>      Physical Exam  Neuro: A+O x 3, non-focal, speech clear and intact  Pulm: CTA, good air entry, equal bilaterally, no accessory muscle use noted  Chest: right sided port  CV: bradycardic, regular rhythm, +S1S2  Abd: soft, NT, ND, +BS  Ext: SCHMIDT x 4, no edema, BLE w/ intact distal motor/neuro/circ  Inc: right groin with dsg C/D/I/soft/TTP

## 2017-12-21 NOTE — DISCHARGE NOTE ADULT - FINDINGS/TREATMENT
Summary:   1. Left ventricular ejection fraction, by visual estimation, is 45%.   2. Mildly to moderately decreased global left ventricular systolic   function.   3. Spectral Doppler shows pseudonormal pattern of left ventricular   myocardial filling   4. Normal right ventricular size and function.   5. Severely enlarged left atrium.   6. Severely dilated right atrium.   7. Sclerotic aortic valve with normal opening.   8. Mild aortic regurgitation.   9. Patient is s/p mitral clip with at least moderate anteriorly directed   regurgitation.  10. Mitral valve mean gradient is 4.1 mmHg consistent with mild mitral   stenosis.  11. Moderate mitral annular calcification.  12. Mild-moderate tricuspid regurgitation.  13. There is no evidence of pericardial effusion.

## 2017-12-21 NOTE — DISCHARGE NOTE ADULT - HOSPITAL COURSE
12/20 no intaop issues, extubated in OR, mild residual MR with mean gradient 3 mmHg     Summary:   1. Left ventricular ejection fraction, by visual estimation, is 45%.   2. Mildly to moderately decreased global left ventricular systolic   function.   3. Spectral Doppler shows pseudonormal pattern of left ventricular   myocardial filling   4. Normal right ventricular size and function.   5. Severely enlarged left atrium.   6. Severely dilated right atrium.   7. Sclerotic aortic valve with normal opening.   8. Mild aortic regurgitation.   9. Patient is s/p mitral clip with at least moderate anteriorly directed   regurgitation.  10. Mitral valve mean gradient is 4.1 mmHg consistent with mild mitral   stenosis.  11. Moderate mitral annular calcification.  12. Mild-moderate tricuspid regurgitation.  13. There is no evidence of pericardial effusion.

## 2017-12-21 NOTE — DISCHARGE NOTE ADULT - PLAN OF CARE
Recovery From Procedure 1. Take ALL of your medications as ordered. Fill your prescriptions the day you are discharged and take according to the schedule you were given. Continue to take a stool softener if you are taking narcotic pain medications. AVOID medications such as ibuprofen or naproxen if you have had bypass surgery. If you have any questions or are unable to fill the prescriptions, please call the office right away at [phone].  2. Shower daily. Clean all incisions daily while showering with warm water and mild soap, pat dry with a clean towel and do not cover with any dressings unless instructed to. No bathing, swimming in a pool or the ocean until instructed by MD.  DO NOT use creams or lotions on the wound.  3. We advise that you do not drive until instructed by MD.   4. You may not return to work until instructed by MD.   5. Please eat a low fat, low cholesterol, low salt diet. (No added/extra salt)  6. Weigh yourself every day in the morning and record it in the weight log in your red folder. Notify the office of any weight gain more than 2-3 pounds in 24 hours.  7. Continue breathing exercises several times a day. Continue to use your heart pillow.  8. No heavy lifting nothing greater than 5 pounds until cleared by MD.   9. Call / Notify MD any fever greater than 101.0, any drainage from incisions or if they become red, hot or very tender to the touch.  10. Increase activity as tolerated. Walk indoors and/or outdoors at least 3 times a day.     Please call the Cardiothoracic Surgery office at [phone] if you are experiencing any shortness of breath, chest pain, fevers or chills, drainage from the incisions, persistent nausea, vomiting or if you have any questions about your medications. If the symptoms are severe, call 911 and go to the nearest hospital. Wash incisions with soap and water using washcloth in shower daily     Please follow up with your Cardiologist and Primary Care Physician 2-4 weeks from discharge.     1. Daily Shower  2. Weight yourself daily and notify any weight gain greater than 2-3 pounds in 24 hours.  3. Regular diet - low fat, low cholesterol, no added salt.  4. Cleanse leg incision daily while showering with warm water and mild soap, pat dry and maintain open to air.   6. No driving until cleared by MD.   7. No heavy lifting nothing greater than 5 pounds until cleared by MD.   8. Call / Notify MD any fever greater than 101.0  9. Increase Activity as tolerated.

## 2017-12-21 NOTE — DISCHARGE NOTE ADULT - NS AS ACTIVITY OBS
Showering allowed/Keep Dressing Clean and Dry/Walking-Outdoors allowed/Walking-Indoors allowed/No Heavy lifting/straining

## 2017-12-21 NOTE — DISCHARGE NOTE ADULT - VISION (WITH CORRECTIVE LENSES IF THE PATIENT USUALLY WEARS THEM):
glasses - bifocals/Partially impaired: cannot see medication labels or newsprint, but can see obstacles in path, and the surrounding layout; can count fingers at arm's length

## 2017-12-21 NOTE — PROGRESS NOTE ADULT - PROBLEM SELECTOR PLAN 2
restart metoprolol in am as HR will tolerate (may need dose held/decreased since HR in 50s)  resume daily lasix as prior to admission this am

## 2017-12-21 NOTE — DISCHARGE NOTE ADULT - ADDITIONAL INSTRUCTIONS
Please follow up with Dr. Figueroa on 1/11 at 3pm.  The cardiac surgery office is on the second floor of [facility].   Take the [address] ("G") elevators to 2 and make a left.   Walk down to the second hallway on your left (before the double doors).   Sign says Cardiovascular and Thoracic Surgery.  The waiting room is on your left.

## 2017-12-21 NOTE — PHYSICAL THERAPY INITIAL EVALUATION ADULT - ADDITIONAL COMMENTS
Pt lives on the first floor of a house with 4 steps to enter (+) HR and no steps inside. Her son lives upstairs and is home 24/7 and available to assist her and her  as needed.

## 2018-01-10 ENCOUNTER — RECORD ABSTRACTING (OUTPATIENT)
Age: 83
End: 2018-01-10

## 2018-01-10 RX ORDER — METOPROLOL SUCCINATE 25 MG/1
25 TABLET, EXTENDED RELEASE ORAL
Refills: 0 | Status: DISCONTINUED | COMMUNITY
End: 2018-01-10

## 2018-01-11 ENCOUNTER — APPOINTMENT (OUTPATIENT)
Dept: CARDIOTHORACIC SURGERY | Facility: CLINIC | Age: 83
End: 2018-01-11
Payer: MEDICARE

## 2018-01-11 VITALS
HEART RATE: 64 BPM | SYSTOLIC BLOOD PRESSURE: 130 MMHG | DIASTOLIC BLOOD PRESSURE: 76 MMHG | BODY MASS INDEX: 20.38 KG/M2 | OXYGEN SATURATION: 98 % | WEIGHT: 115 LBS | HEIGHT: 63 IN | RESPIRATION RATE: 16 BRPM

## 2018-01-11 PROCEDURE — 99024 POSTOP FOLLOW-UP VISIT: CPT

## 2018-07-16 PROBLEM — F32.9 MAJOR DEPRESSIVE DISORDER, SINGLE EPISODE, UNSPECIFIED: Chronic | Status: INACTIVE | Noted: 2017-11-29 | Resolved: 2017-12-13

## 2023-02-08 ENCOUNTER — OFFICE (OUTPATIENT)
Dept: URBAN - METROPOLITAN AREA CLINIC 109 | Facility: CLINIC | Age: 88
Setting detail: OPHTHALMOLOGY
End: 2023-02-08
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
## 2023-02-08 PROCEDURE — NO SHOW FE NO SHOW FEE: Performed by: OPHTHALMOLOGY

## 2023-03-07 ENCOUNTER — RESULT REVIEW (OUTPATIENT)
Age: 88
End: 2023-03-07

## 2023-03-07 ENCOUNTER — APPOINTMENT (OUTPATIENT)
Dept: RADIOLOGY | Facility: CLINIC | Age: 88
End: 2023-03-07
Payer: MEDICARE

## 2023-03-07 PROBLEM — C34.90 MALIGNANT NEOPLASM OF UNSPECIFIED PART OF UNSPECIFIED BRONCHUS OR LUNG: Chronic | Status: ACTIVE | Noted: 2017-12-13

## 2023-03-07 PROBLEM — C44.91 BASAL CELL CARCINOMA OF SKIN, UNSPECIFIED: Chronic | Status: ACTIVE | Noted: 2017-12-13

## 2023-03-07 PROBLEM — I34.0 NONRHEUMATIC MITRAL (VALVE) INSUFFICIENCY: Chronic | Status: ACTIVE | Noted: 2017-11-29

## 2023-03-07 PROBLEM — C73 MALIGNANT NEOPLASM OF THYROID GLAND: Chronic | Status: ACTIVE | Noted: 2017-12-13

## 2023-03-07 PROBLEM — I73.9 PERIPHERAL VASCULAR DISEASE, UNSPECIFIED: Chronic | Status: ACTIVE | Noted: 2017-11-29

## 2023-03-07 PROBLEM — E78.5 HYPERLIPIDEMIA, UNSPECIFIED: Chronic | Status: ACTIVE | Noted: 2017-11-29

## 2023-03-07 PROBLEM — C67.9 MALIGNANT NEOPLASM OF BLADDER, UNSPECIFIED: Chronic | Status: ACTIVE | Noted: 2017-12-13

## 2023-03-07 PROBLEM — J44.9 CHRONIC OBSTRUCTIVE PULMONARY DISEASE, UNSPECIFIED: Chronic | Status: ACTIVE | Noted: 2017-11-29

## 2023-03-07 PROBLEM — I25.10 ATHEROSCLEROTIC HEART DISEASE OF NATIVE CORONARY ARTERY WITHOUT ANGINA PECTORIS: Chronic | Status: ACTIVE | Noted: 2017-11-29

## 2023-03-07 PROBLEM — H40.9 UNSPECIFIED GLAUCOMA: Chronic | Status: ACTIVE | Noted: 2017-12-13

## 2023-03-07 PROBLEM — D49.3 NEOPLASM OF UNSPECIFIED BEHAVIOR OF BREAST: Chronic | Status: ACTIVE | Noted: 2017-11-29

## 2023-03-07 PROBLEM — I10 ESSENTIAL (PRIMARY) HYPERTENSION: Chronic | Status: ACTIVE | Noted: 2017-11-29

## 2023-03-07 PROBLEM — F41.9 ANXIETY DISORDER, UNSPECIFIED: Chronic | Status: ACTIVE | Noted: 2017-11-29

## 2023-03-07 PROBLEM — R53.83 OTHER FATIGUE: Chronic | Status: ACTIVE | Noted: 2017-11-29

## 2023-03-07 PROBLEM — I27.20 PULMONARY HYPERTENSION, UNSPECIFIED: Chronic | Status: ACTIVE | Noted: 2017-11-29

## 2023-03-07 PROBLEM — I07.1 RHEUMATIC TRICUSPID INSUFFICIENCY: Chronic | Status: ACTIVE | Noted: 2017-11-29

## 2023-03-07 PROCEDURE — 71046 X-RAY EXAM CHEST 2 VIEWS: CPT

## 2023-03-13 VITALS — HEIGHT: 62 IN | WEIGHT: 97.44 LBS

## 2023-03-13 RX ORDER — METOPROLOL TARTRATE 50 MG
1 TABLET ORAL
Qty: 0 | Refills: 0 | DISCHARGE

## 2023-03-13 RX ORDER — HEPATITIS B IMMUNE GLOBULIN (HUMAN) 1560 [IU]/5ML
0.06 LIQUID INTRAMUSCULAR
Qty: 0 | Refills: 0 | DISCHARGE

## 2023-03-13 RX ORDER — FUROSEMIDE 40 MG
2 TABLET ORAL
Qty: 0 | Refills: 0 | DISCHARGE

## 2023-03-13 RX ORDER — DIAZEPAM 5 MG
0 TABLET ORAL
Qty: 0 | Refills: 0 | DISCHARGE

## 2023-03-13 NOTE — ASU PATIENT PROFILE, ADULT - NSICDXPASTMEDICALHX_GEN_ALL_CORE_FT
PAST MEDICAL HISTORY:  Anxiety     ASHD (arteriosclerotic heart disease)     Basal cell carcinoma     Bladder cancer     Breast neoplasm     COPD (chronic obstructive pulmonary disease)     Fatigue     Glaucoma     Hyperlipidemia     Hypertension     Lung cancer     Mitral valve regurgitation     Pulmonary hypertension     PVD (peripheral vascular disease)     Thyroid ca     Tricuspid insufficiency

## 2023-03-13 NOTE — ASU PATIENT PROFILE, ADULT - NSICDXPASTSURGICALHX_GEN_ALL_CORE_FT
PAST SURGICAL HISTORY:  H/O breast surgery     History of bladder surgery     History of cataract surgery bilateral    History of thyroid surgery     S/P Mohs surgery for basal cell carcinoma

## 2023-03-13 NOTE — ASU PATIENT PROFILE, ADULT - NS PRO AD PATIENT TYPE
Inocente, Son, [phone]/Health Care Proxy (HCP) Inocente, Son, [phone]/Health Care Proxy (HCP)/Do Not Resuscitate (DNR)/Medical Orders for Life-Sustaining Treatment (MOLST)

## 2023-03-13 NOTE — ASU PATIENT PROFILE, ADULT - FALL HARM RISK - HARM RISK INTERVENTIONS

## 2023-03-16 ENCOUNTER — RESULT REVIEW (OUTPATIENT)
Age: 88
End: 2023-03-16

## 2023-03-16 ENCOUNTER — TRANSCRIPTION ENCOUNTER (OUTPATIENT)
Age: 88
End: 2023-03-16

## 2023-03-16 ENCOUNTER — OUTPATIENT (OUTPATIENT)
Dept: OUTPATIENT SERVICES | Facility: HOSPITAL | Age: 88
LOS: 1 days | End: 2023-03-16
Payer: MEDICARE

## 2023-03-16 VITALS
RESPIRATION RATE: 20 BRPM | DIASTOLIC BLOOD PRESSURE: 80 MMHG | HEART RATE: 70 BPM | SYSTOLIC BLOOD PRESSURE: 136 MMHG | OXYGEN SATURATION: 99 %

## 2023-03-16 DIAGNOSIS — Z98.890 OTHER SPECIFIED POSTPROCEDURAL STATES: Chronic | ICD-10-CM

## 2023-03-16 DIAGNOSIS — J91.0 MALIGNANT PLEURAL EFFUSION: ICD-10-CM

## 2023-03-16 DIAGNOSIS — I50.20 UNSPECIFIED SYSTOLIC (CONGESTIVE) HEART FAILURE: ICD-10-CM

## 2023-03-16 DIAGNOSIS — Z00.00 ENCOUNTER FOR GENERAL ADULT MEDICAL EXAMINATION WITHOUT ABNORMAL FINDINGS: ICD-10-CM

## 2023-03-16 DIAGNOSIS — Z98.49 CATARACT EXTRACTION STATUS, UNSPECIFIED EYE: Chronic | ICD-10-CM

## 2023-03-16 DIAGNOSIS — J90 PLEURAL EFFUSION, NOT ELSEWHERE CLASSIFIED: ICD-10-CM

## 2023-03-16 LAB
ALBUMIN FLD-MCNC: 1.3 G/DL — SIGNIFICANT CHANGE UP
B PERT IGG+IGM PNL SER: CLEAR — SIGNIFICANT CHANGE UP
COLOR FLD: YELLOW — SIGNIFICANT CHANGE UP
EOSINOPHIL # FLD: 0 % — SIGNIFICANT CHANGE UP
FLUID INTAKE SUBSTANCE CLASS: SIGNIFICANT CHANGE UP
FOLATE+VIT B12 SERBLD-IMP: 0 % — SIGNIFICANT CHANGE UP
GLUCOSE FLD-MCNC: 111 MG/DL — SIGNIFICANT CHANGE UP
GRAM STN FLD: SIGNIFICANT CHANGE UP
LDH SERPL L TO P-CCNC: 63 U/L — SIGNIFICANT CHANGE UP
LYMPHOCYTES # FLD: 69 % — SIGNIFICANT CHANGE UP
MESOTHL CELL # FLD: 0 % — SIGNIFICANT CHANGE UP
MONOS+MACROS # FLD: 16 % — SIGNIFICANT CHANGE UP
NEUTROPHILS-BODY FLUID: 15 % — SIGNIFICANT CHANGE UP
NRBC # FLD: 0 % — SIGNIFICANT CHANGE UP
OTHER CELLS FLD MANUAL: 0 % — SIGNIFICANT CHANGE UP
PH FLD: 7.5 — SIGNIFICANT CHANGE UP
PROT FLD-MCNC: 1.9 G/DL — SIGNIFICANT CHANGE UP
RCV VOL RI: < 2000 /UL — SIGNIFICANT CHANGE UP (ref 0–0)
SPECIMEN SOURCE: SIGNIFICANT CHANGE UP
TOTAL NUCLEATED CELL COUNT, BODY FLUID: 399 /UL — SIGNIFICANT CHANGE UP
TUBE TYPE: SIGNIFICANT CHANGE UP

## 2023-03-16 PROCEDURE — 32555 ASPIRATE PLEURA W/ IMAGING: CPT | Mod: 50

## 2023-03-16 PROCEDURE — 71045 X-RAY EXAM CHEST 1 VIEW: CPT | Mod: 26

## 2023-03-16 PROCEDURE — 88108 CYTOPATH CONCENTRATE TECH: CPT | Mod: 26

## 2023-03-16 PROCEDURE — 88305 TISSUE EXAM BY PATHOLOGIST: CPT | Mod: 26

## 2023-03-16 RX ORDER — METOPROLOL TARTRATE 50 MG
75 TABLET ORAL
Qty: 0 | Refills: 0 | DISCHARGE

## 2023-03-16 NOTE — ASU DISCHARGE PLAN (ADULT/PEDIATRIC) - NS MD DC FALL RISK RISK
For information on Fall & Injury Prevention, visit: [email or web address] OR  [email or web address] OR  https://www.cdc.gov/steadi/patient.html

## 2023-03-16 NOTE — ASU DISCHARGE PLAN (ADULT/PEDIATRIC) - ASU DC SPECIAL INSTRUCTIONSFT
Thoracentesis    Discharge Instructions  - You have had a right thoracentesis.  - You may shower in 24 hours.  - Keep the area covered and dry for the next 24 hours.  - Do not perform any heavy lifting until the site is healed.  - You may resume your normal diet.  - You may resume your normal medications.   - It is normal to experience some pain over the site for the next few days. You may take Tylenol for that pain. Do not take more frequently than every 6 hours and do not exceed more than 3000mg of Tylenol in a 24 hour period.    Notify your primary physician and/or Interventional Radiology IMMEDIATELY if you experience any of the following       - Fever of 101F or 38C       - Chills or Rigors/ Shakes       - Swelling and/or Redness in the area around the biopsy site       - Worsening Pain       - Blood soaked bandages or worsening bleeding       - Lightheadedness and/or dizziness upon standing       - Chest Pain/ Tightness       - Shortness of Breath       - Difficulty walking    If you have a problem that you believe requires IMMEDIATE attention, please go to your NEAREST Emergency Room. If you believe your problem can safely wait until you speak to a physician, please call Interventional Radiology for any concerns.

## 2023-03-16 NOTE — PRE PROCEDURE NOTE - PRE PROCEDURE EVALUATION
Interventional Radiology Pre-Procedure Note    Patient is a 89y old Female who presents with bilateral pleural effusions here for right thoracentesis.    PAST MEDICAL & SURGICAL HISTORY:  COPD (chronic obstructive pulmonary disease)      ASHD (arteriosclerotic heart disease)      Hyperlipidemia      Hypertension      Anxiety      Breast neoplasm      Fatigue      Mitral valve regurgitation      Pulmonary hypertension      PVD (peripheral vascular disease)      Tricuspid insufficiency      Bladder cancer      Thyroid ca      Basal cell carcinoma      Lung cancer      Glaucoma      History of bladder surgery      H/O breast surgery      History of cataract surgery  bilateral      History of thyroid surgery      S/P Mohs surgery for basal cell carcinoma           Allergies: Biaxin (Rash; Urticaria)  Demerol HCl (Flushing)  latex (Unknown)  penicillin (Rash; Urticaria)  shellfish (Flushing; Rash)  sulfa drugs (Rash)      Procedure/ risks/ benefits were explained, informed consent obtained from patient, verbalizes understanding.

## 2023-03-16 NOTE — PROCEDURE NOTE - NSINFORMCONSENT_GEN_A_CORE
pts son/Benefits, risks, and possible complications of procedure explained to patient/caregiver who verbalized understanding and gave written consent.

## 2023-03-16 NOTE — PROCEDURE NOTE - NSPROCDETAILS_GEN_ALL_CORE
location identified, draped/prepped, sterile technique used, needle inserted/introduced/connection to evacuated glass bottle/ultrasound assessment of effusion (localization)

## 2023-03-17 PROCEDURE — C1729: CPT

## 2023-03-17 PROCEDURE — 82945 GLUCOSE OTHER FLUID: CPT

## 2023-03-17 PROCEDURE — 82042 OTHER SOURCE ALBUMIN QUAN EA: CPT

## 2023-03-17 PROCEDURE — 83615 LACTATE (LD) (LDH) ENZYME: CPT

## 2023-03-17 PROCEDURE — 84157 ASSAY OF PROTEIN OTHER: CPT

## 2023-03-17 PROCEDURE — 87102 FUNGUS ISOLATION CULTURE: CPT

## 2023-03-17 PROCEDURE — 83986 ASSAY PH BODY FLUID NOS: CPT

## 2023-03-17 PROCEDURE — 89051 BODY FLUID CELL COUNT: CPT

## 2023-03-17 PROCEDURE — 88305 TISSUE EXAM BY PATHOLOGIST: CPT

## 2023-03-17 PROCEDURE — 88108 CYTOPATH CONCENTRATE TECH: CPT

## 2023-03-17 PROCEDURE — 87070 CULTURE OTHR SPECIMN AEROBIC: CPT

## 2023-03-17 PROCEDURE — 87205 SMEAR GRAM STAIN: CPT

## 2023-03-17 PROCEDURE — 32555 ASPIRATE PLEURA W/ IMAGING: CPT

## 2023-03-17 PROCEDURE — 71045 X-RAY EXAM CHEST 1 VIEW: CPT

## 2023-03-17 PROCEDURE — 87075 CULTR BACTERIA EXCEPT BLOOD: CPT

## 2023-03-21 LAB
CULTURE RESULTS: SIGNIFICANT CHANGE UP
SPECIMEN SOURCE: SIGNIFICANT CHANGE UP

## 2023-03-28 ENCOUNTER — APPOINTMENT (OUTPATIENT)
Dept: CARDIOTHORACIC SURGERY | Facility: CLINIC | Age: 88
End: 2023-03-28
Payer: MEDICARE

## 2023-03-28 VITALS
OXYGEN SATURATION: 94 % | DIASTOLIC BLOOD PRESSURE: 80 MMHG | HEIGHT: 63 IN | WEIGHT: 97 LBS | HEART RATE: 74 BPM | RESPIRATION RATE: 16 BRPM | SYSTOLIC BLOOD PRESSURE: 133 MMHG | BODY MASS INDEX: 17.19 KG/M2

## 2023-03-28 PROCEDURE — 99214 OFFICE O/P EST MOD 30 MIN: CPT

## 2023-03-28 RX ORDER — CALCIUM CARBONATE/VITAMIN D3 600 MG-10
TABLET ORAL
Refills: 0 | Status: COMPLETED | COMMUNITY
End: 2023-03-28

## 2023-03-28 RX ORDER — MUPIROCIN 20 MG/G
2 OINTMENT TOPICAL TWICE DAILY
Qty: 1 | Refills: 0 | Status: COMPLETED | COMMUNITY
Start: 2017-12-15 | End: 2023-03-28

## 2023-03-28 RX ORDER — BIMATOPROST 0.1 MG/ML
0.01 SOLUTION/ DROPS OPHTHALMIC
Qty: 75 | Refills: 0 | Status: COMPLETED | COMMUNITY
Start: 2017-07-21 | End: 2023-03-28

## 2023-03-28 RX ORDER — BIMATOPROST 0.3 MG/ML
0.03 SOLUTION/ DROPS OPHTHALMIC
Refills: 0 | Status: COMPLETED | COMMUNITY
End: 2023-03-28

## 2023-03-28 RX ORDER — TRASTUZUMAB 440 MG
440 KIT INTRAVENOUS
Refills: 0 | Status: COMPLETED | COMMUNITY
End: 2023-03-28

## 2023-03-28 RX ORDER — BRIMONIDINE TARTRATE 1 MG/ML
0.1 SOLUTION/ DROPS OPHTHALMIC
Refills: 0 | Status: COMPLETED | COMMUNITY
End: 2023-03-28

## 2023-03-28 RX ORDER — ZOLPIDEM TARTRATE 5 MG/1
5 TABLET, FILM COATED ORAL
Refills: 0 | Status: COMPLETED | COMMUNITY
End: 2023-03-28

## 2023-03-28 RX ORDER — METOPROLOL SUCCINATE 25 MG/1
25 TABLET, EXTENDED RELEASE ORAL DAILY
Refills: 0 | Status: COMPLETED | COMMUNITY
End: 2023-03-28

## 2023-03-28 RX ORDER — FUROSEMIDE 20 MG/1
20 TABLET ORAL
Refills: 0 | Status: COMPLETED | COMMUNITY
End: 2023-03-28

## 2023-03-28 RX ORDER — OMEPRAZOLE 40 MG/1
40 CAPSULE, DELAYED RELEASE ORAL
Refills: 0 | Status: COMPLETED | COMMUNITY
End: 2023-03-28

## 2023-03-28 NOTE — HISTORY OF PRESENT ILLNESS
[FreeTextEntry1] : Mrs. Salmeron, an 89-year-old patient referred by Dr. Wood, is here for an initial evaluation due to severe mitral regurgitation and to determine if she is a candidate for a mitral clip. She reported to her cardiologist with worsening shortness of breath on exertion over the past three months. Subsequent TTE performed on 3/13/2023 showed severe aortic stenosis, Mitral Clip with moderate to severe anteriorly directed MR jet. The findings revealed a changed from a previous echo performed on 02/28/2022. \par \par \par Her past medical history is significant for multiple conditions including hypertension (HTN), high cholesterol (HLD), aortic regurgitation, atrial fibrillation s/p ablation (she refuses anticoagulation therapy), peripheral vascular disease, bladder neoplasm, carotid artery stenosis, colonic polyps, supraventricular tachycardia (SVT) previously treated with ablation, diastolic dysfunction, gastroesophageal reflux disease, hypothyroidism, left ventricular hypertrophy, lung cancer, pulmonary hypertension, open-angle glaucoma, mitral regurgitation s/p mitral valve repair, mitral valve prolapse, and a mitral clip procedure performed on 12/20/2017 by Dr. Figueroa.\par \par She is frail and sedentary. She is barely able to ambulate with a walker. She is a full 1 person assist and is cared for by her son who helps her with all ADL.

## 2023-03-28 NOTE — REVIEW OF SYSTEMS
[Feeling Poorly] : feeling poorly [Feeling Tired] : feeling tired [Shortness Of Breath] : shortness of breath [SOB on Exertion] : shortness of breath during exertion [Negative] : Gastrointestinal [Chest Pain] : no chest pain [Palpitations] : no palpitations [Cough] : no cough

## 2023-03-28 NOTE — CONSULT LETTER
[Dear  ___] : Dear  [unfilled], [Courtesy Letter:] : I had the pleasure of seeing your patient, [unfilled], in my office today. [Please see my note below.] : Please see my note below. [Referral Closing:] : Thank you very much for seeing this patient.  If you have any questions, please do not hesitate to contact me. [Sincerely,] : Sincerely, [FreeTextEntry2] : Dr. Wood [FreeTextEntry3] : Frantz Figueroa MD\par  of Cardiovascular & Thoracic Surgery\par Associated Professor \par Cardiovascular & Thoracic Surgery\par [employer]\par \par [facility] \par [address] \[place] [address]\par Tel   [phone]\par Fax  [phone]\par

## 2023-03-28 NOTE — DATA REVIEWED
[FreeTextEntry1] : TTE performed on 3/12/2023 at [facility] showed \par - LVEF 26%\par -significant echocardiographic findings include dilated left atrium, a sigmoid septum, aortic valve sclerosis, a pleural effusion was noted, and abnormal Mitral Clip\par - significant Doppler findings include trace aortic insufficiency, mild pulmonary HTN, mild tricuspid insufficiency, mid-moderate tricuspid insufficiency and severe aortic stenosis based on continuity equation and moderate aortic valve stenosis by DVI s/p Mitral Clip with moderate to severe anteriorly directed MR jet. elevated LAP. \par \par

## 2023-03-28 NOTE — PHYSICAL EXAM
[General Appearance - Well Nourished] : well nourished [Sclera] : the sclera and conjunctiva were normal [Outer Ear] : the ears and nose were normal in appearance [Neck Appearance] : the appearance of the neck was normal [Respiration, Rhythm And Depth] : normal respiratory rhythm and effort [Auscultation Breath Sounds / Voice Sounds] : lungs were clear to auscultation bilaterally [Heart Rate And Rhythm] : heart rate was normal and rhythm regular [Examination Of The Chest] : the chest was normal in appearance [Skin Color & Pigmentation] : normal skin color and pigmentation [Sensation] : the sensory exam was normal to light touch and pinprick [Oriented To Time, Place, And Person] : oriented to person, place, and time [Impaired Insight] : insight and judgment were intact [FreeTextEntry1] : weak frail 1 person full assist

## 2023-04-15 LAB
CULTURE RESULTS: SIGNIFICANT CHANGE UP
SPECIMEN SOURCE: SIGNIFICANT CHANGE UP

## 2023-04-20 DIAGNOSIS — C34.90 MALIGNANT NEOPLASM OF UNSPECIFIED PART OF UNSPECIFIED BRONCHUS OR LUNG: ICD-10-CM

## 2023-04-20 DIAGNOSIS — C67.9 MALIGNANT NEOPLASM OF BLADDER, UNSPECIFIED: ICD-10-CM

## 2023-04-20 DIAGNOSIS — C50.919 MALIGNANT NEOPLASM OF UNSPECIFIED SITE OF UNSPECIFIED FEMALE BREAST: ICD-10-CM

## 2023-04-20 DIAGNOSIS — C73 MALIGNANT NEOPLASM OF THYROID GLAND: ICD-10-CM

## 2023-04-24 ENCOUNTER — APPOINTMENT (OUTPATIENT)
Dept: HEART FAILURE | Facility: CLINIC | Age: 88
End: 2023-04-24
Payer: MEDICARE

## 2023-04-24 VITALS
DIASTOLIC BLOOD PRESSURE: 90 MMHG | WEIGHT: 99 LBS | HEIGHT: 63 IN | OXYGEN SATURATION: 94 % | BODY MASS INDEX: 17.54 KG/M2 | HEART RATE: 79 BPM | SYSTOLIC BLOOD PRESSURE: 129 MMHG

## 2023-04-24 DIAGNOSIS — I73.9 PERIPHERAL VASCULAR DISEASE, UNSPECIFIED: ICD-10-CM

## 2023-04-24 PROCEDURE — 99205 OFFICE O/P NEW HI 60 MIN: CPT

## 2023-04-24 NOTE — CARDIOLOGY SUMMARY
[de-identified] : 1/3/23 EKG AF, PVC, ?old AS MI. \par  [de-identified] : 3/13/23 TTE: LVEF 52% LVIDd 4.8cm, s/p mitral clip w residual mod-sev MR, severe AS (Vmax 1.7 m/s, peak/mean 12/7mmHg, YOHAN 0.92cm2, SV 40), RV normal size/function, mild-mod TR, RVSP 43mmHg. \par  [de-identified] : 2017 C: minor luminal irregularities LVEDP 16

## 2023-04-24 NOTE — REASON FOR VISIT
[FreeTextEntry3] : Niall Wood and Frantz Figueroa [FreeTextEntry1] : Cards: Say Wood DO\par \par CTS: Frantz Figueroa MD\par \par PCP: Tirso Juarez MD

## 2023-04-24 NOTE — ASSESSMENT
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 NYHA III\par Clinically seems close to euvolemia except for ?pleural effusion. \par GDMT: Toprol xl 75mg daily. PLan to add aldactone/Farxiga. Will obtain labs first. \par Her PCP recently obtained a CxR, will request records. She was advised to follow up w him to assess need of thoracentesis. Should consider diagnostic studies given her h/o untreated lung cancer. \par HF education provided including lifestyle changes (low Na diet, fluid restriction, increase physical activity), current clinical condition, natural progression and prognosis.\par \par # Valvular heart disease\par MR s/p mitral clip '17 - now w residual MR\par ??severe AS LFLG as per TTE - doppler data is confusing: Vmax 1.7 m/s, peak/mean 12/7mmHg, YOHAN 0.92cm2, SV 40. Images are not available.\par Should reassess MR once on full GDMT\par Pending MEGHAN \par \par #Oncology\par H/o multiple cancers: thyroid, breast w recurrence (s/p lumpectomy x2, chemo) and untreated lung cancer/nodule\par Recommend pulmonology follow up for recurrent pleural effusions

## 2023-04-24 NOTE — PHYSICAL EXAM
[Frail] : frail [Cachectic] : cachexia was observed [Murmur] : murmur [Normal] : soft, non-tender, no masses/organomegaly, normal bowel sounds [No Edema] : no edema [No Rash] : no rash [Alert and Oriented] : alert and oriented [de-identified] : Pt is on a wheelchair [de-identified] : 2-3/6 holosyst predom at apex [de-identified] : irregularly irregular [de-identified] : decreased breath sounds at the bases

## 2023-04-24 NOTE — HISTORY OF PRESENT ILLNESS
[FreeTextEntry1] : 89 y/o female with h/o NICMP LVEF 45-->52%, severe MR s/p mitral clip '17 w residual mod-sev MR, AFib s/p ablation (refuses AC), PVD/carotid art stenosis, HTN, DLP, SVT ablation, bladder neoplasm s/p resection, breast cancer lumpectomy+lymph nodes w recurrence s/p chemo x 2, h/o lung cancer (started chemo many years ago but never finished her course) and hypothyroidism who was referred for optimization and consideration of mitral clip. \par \par Ms. Salmeron is accompanied by her son who provides most of the history. They report worsening respiratory symptoms for the past 1-2 months. She's had MALIN, orthopnea and PND. There is no report of LE edema, palpitations, chest pain or dizziness. These symptoms were associated to elevated BP (SBP in the 180s) which has now improved after Toprol was increased. She follows frequently with her PCP. She has not had any recent HF hospitalizations (last hospitalization was many years ago). However, she has required at least one thoracentesis due to pleural effusion. His son believes that she's building fluid again. No recent changes in her weight. \par \par She recently underwent a TTE on 3/2023 which showed LVEF 52%, s/p s/p mitral clip w residual mod-sev MR, severe AS (Vmax 1.7 m/s, peak/mean 12/7mmHg, YOHAN 0.92cm2, SV 40), RV normal size/function, mild-mod TR, RVSP 43mmHg. She was referred to see Dr. Figueroa for her MR. He has referred her to us for optimization prior to consideration. \par \par \par

## 2023-04-24 NOTE — DISCUSSION/SUMMARY
[FreeTextEntry1] : \par 88 y/o female with h/o NICMP LVEF 45-->52%, severe MR s/p mitral clip '17 w residual mod-sev MR, AFib s/p ablation (refuses AC), PVD/carotid art stenosis, HTN, DLP, SVT ablation, bladder neoplasm s/p resection, breast cancer lumpectomy+lymph nodes w recurrence s/p chemo x 2, h/o lung cancer (started chemo many years ago but never finished her course) and hypothyroidism who was referred for optimization and consideration of mitral clip. \par Pt reports NYHA II-III symptoms. She has no Le edema and JVD seems close to normal. Her BS are decreased which may represent pleural effusion. She is going to follow up with her PCP who did a CxR a few days ago. We plan to add MRA/SGLT2i after labs are obtained. \par She will need a repeat TTE once she's euvolemic and on GDMT. Should consider f/u w pulmonology due to recurrent pleural effusion and h/o malignancy.

## 2023-04-27 ENCOUNTER — NON-APPOINTMENT (OUTPATIENT)
Age: 88
End: 2023-04-27

## 2023-04-27 ENCOUNTER — OUTPATIENT (OUTPATIENT)
Dept: OUTPATIENT SERVICES | Facility: HOSPITAL | Age: 88
LOS: 1 days | End: 2023-04-27
Payer: MEDICARE

## 2023-04-27 DIAGNOSIS — Z98.890 OTHER SPECIFIED POSTPROCEDURAL STATES: Chronic | ICD-10-CM

## 2023-04-27 DIAGNOSIS — I50.20 UNSPECIFIED SYSTOLIC (CONGESTIVE) HEART FAILURE: ICD-10-CM

## 2023-04-27 DIAGNOSIS — J90 PLEURAL EFFUSION, NOT ELSEWHERE CLASSIFIED: ICD-10-CM

## 2023-04-27 DIAGNOSIS — Z98.49 CATARACT EXTRACTION STATUS, UNSPECIFIED EYE: Chronic | ICD-10-CM

## 2023-04-27 PROCEDURE — 36415 COLL VENOUS BLD VENIPUNCTURE: CPT

## 2023-04-27 PROCEDURE — 85610 PROTHROMBIN TIME: CPT

## 2023-04-27 PROCEDURE — 85730 THROMBOPLASTIN TIME PARTIAL: CPT

## 2023-04-27 RX ORDER — METOPROLOL TARTRATE 50 MG
37.5 TABLET ORAL
Qty: 0 | Refills: 0 | DISCHARGE

## 2023-04-27 NOTE — ASU PATIENT PROFILE, ADULT - NSICDXPASTSURGICALHX_GEN_ALL_CORE_FT
PAST SURGICAL HISTORY:  H/O breast surgery     H/O nasal polypectomy     History of bladder surgery     History of cataract surgery bilateral    History of thyroid surgery     S/P Mohs surgery for basal cell carcinoma

## 2023-04-28 ENCOUNTER — RESULT REVIEW (OUTPATIENT)
Age: 88
End: 2023-04-28

## 2023-04-28 ENCOUNTER — OUTPATIENT (OUTPATIENT)
Dept: OUTPATIENT SERVICES | Facility: HOSPITAL | Age: 88
LOS: 1 days | End: 2023-04-28
Payer: MEDICARE

## 2023-04-28 ENCOUNTER — TRANSCRIPTION ENCOUNTER (OUTPATIENT)
Age: 88
End: 2023-04-28

## 2023-04-28 VITALS
SYSTOLIC BLOOD PRESSURE: 132 MMHG | OXYGEN SATURATION: 96 % | RESPIRATION RATE: 29 BRPM | DIASTOLIC BLOOD PRESSURE: 82 MMHG | HEART RATE: 74 BPM

## 2023-04-28 VITALS
RESPIRATION RATE: 30 BRPM | OXYGEN SATURATION: 96 % | DIASTOLIC BLOOD PRESSURE: 81 MMHG | HEART RATE: 85 BPM | SYSTOLIC BLOOD PRESSURE: 128 MMHG

## 2023-04-28 DIAGNOSIS — Z98.890 OTHER SPECIFIED POSTPROCEDURAL STATES: Chronic | ICD-10-CM

## 2023-04-28 DIAGNOSIS — I50.9 HEART FAILURE, UNSPECIFIED: ICD-10-CM

## 2023-04-28 DIAGNOSIS — Z98.49 CATARACT EXTRACTION STATUS, UNSPECIFIED EYE: Chronic | ICD-10-CM

## 2023-04-28 LAB
GRAM STN FLD: SIGNIFICANT CHANGE UP
SPECIMEN SOURCE: SIGNIFICANT CHANGE UP

## 2023-04-28 PROCEDURE — 32555 ASPIRATE PLEURA W/ IMAGING: CPT | Mod: RT

## 2023-04-28 PROCEDURE — 88305 TISSUE EXAM BY PATHOLOGIST: CPT | Mod: 26

## 2023-04-28 PROCEDURE — 88108 CYTOPATH CONCENTRATE TECH: CPT | Mod: 26

## 2023-04-28 PROCEDURE — 71045 X-RAY EXAM CHEST 1 VIEW: CPT | Mod: 26

## 2023-04-28 NOTE — DISCHARGE NOTE NURSING/CASE MANAGEMENT/SOCIAL WORK - NSDCPEFALRISK_GEN_ALL_CORE
For information on Fall & Injury Prevention, visit: [email or web address] OR  [email or web address] OR  https://www.cdc.gov/steadi/patient.html

## 2023-04-28 NOTE — ASU PATIENT PROFILE, ADULT - NS PRO AD PATIENT TYPE ON CHART
DNI, brought by son Efren/Do Not Resuscitate (DNR)/Medical Orders for Life-Sustaining Treatment (MOLST)

## 2023-04-28 NOTE — ASU DISCHARGE PLAN (ADULT/PEDIATRIC) - NS MD DC FALL RISK RISK
For information on Fall & Injury Prevention, visit: [email or web address] OR  [email or web address] OR  https://www.cdc.gov/steadi/patient.html

## 2023-04-28 NOTE — DISCHARGE NOTE NURSING/CASE MANAGEMENT/SOCIAL WORK - PATIENT PORTAL LINK FT
You can access the FollowMyHealth Patient Portal offered by [facility] by registering at the following website: [email or web address]. By joining [employer]’s FollowMyHealth portal, you will also be able to view your health information using other applications (apps) compatible with our system.

## 2023-04-28 NOTE — ASU PATIENT PROFILE, ADULT - FALL HARM RISK - HARM RISK INTERVENTIONS

## 2023-04-28 NOTE — PROCEDURE NOTE - PROCEDURE FINDINGS AND DETAILS
1060cc of clear yellow pleuritic fluid removed from right thorax under sterile conditions and ultrasound guidance.  Post procedure CXR was ordered.

## 2023-04-29 LAB
ALBUMIN FLD-MCNC: 1.4 G/DL — SIGNIFICANT CHANGE UP
B PERT IGG+IGM PNL SER: CLEAR — SIGNIFICANT CHANGE UP
COLOR FLD: YELLOW — SIGNIFICANT CHANGE UP
EOSINOPHIL # FLD: 0 % — SIGNIFICANT CHANGE UP
FLUID INTAKE SUBSTANCE CLASS: SIGNIFICANT CHANGE UP
FOLATE+VIT B12 SERBLD-IMP: 0 % — SIGNIFICANT CHANGE UP
GLUCOSE FLD-MCNC: 113 MG/DL — SIGNIFICANT CHANGE UP
LDH SERPL L TO P-CCNC: 60 U/L — SIGNIFICANT CHANGE UP
LYMPHOCYTES # FLD: 75 % — SIGNIFICANT CHANGE UP
MESOTHL CELL # FLD: 2 % — SIGNIFICANT CHANGE UP
MONOS+MACROS # FLD: 17 % — SIGNIFICANT CHANGE UP
NEUTROPHILS-BODY FLUID: 6 % — SIGNIFICANT CHANGE UP
NRBC # FLD: 0 % — SIGNIFICANT CHANGE UP
OTHER CELLS FLD MANUAL: 0 % — SIGNIFICANT CHANGE UP
PH FLD: 7.7 — SIGNIFICANT CHANGE UP
PROT FLD-MCNC: 2.1 G/DL — SIGNIFICANT CHANGE UP
RCV VOL RI: 1000 /UL — HIGH (ref 0–0)
TOTAL NUCLEATED CELL COUNT, BODY FLUID: 384 /UL — SIGNIFICANT CHANGE UP
TUBE TYPE: SIGNIFICANT CHANGE UP

## 2023-05-01 PROCEDURE — 87075 CULTR BACTERIA EXCEPT BLOOD: CPT

## 2023-05-01 PROCEDURE — 82945 GLUCOSE OTHER FLUID: CPT

## 2023-05-01 PROCEDURE — 88305 TISSUE EXAM BY PATHOLOGIST: CPT

## 2023-05-01 PROCEDURE — C1729: CPT

## 2023-05-01 PROCEDURE — 87205 SMEAR GRAM STAIN: CPT

## 2023-05-01 PROCEDURE — 83986 ASSAY PH BODY FLUID NOS: CPT

## 2023-05-01 PROCEDURE — 82042 OTHER SOURCE ALBUMIN QUAN EA: CPT

## 2023-05-01 PROCEDURE — 87070 CULTURE OTHR SPECIMN AEROBIC: CPT

## 2023-05-01 PROCEDURE — 83615 LACTATE (LD) (LDH) ENZYME: CPT

## 2023-05-01 PROCEDURE — 88108 CYTOPATH CONCENTRATE TECH: CPT

## 2023-05-01 PROCEDURE — 32555 ASPIRATE PLEURA W/ IMAGING: CPT

## 2023-05-01 PROCEDURE — 71045 X-RAY EXAM CHEST 1 VIEW: CPT

## 2023-05-01 PROCEDURE — 89051 BODY FLUID CELL COUNT: CPT

## 2023-05-01 PROCEDURE — 84157 ASSAY OF PROTEIN OTHER: CPT

## 2023-05-02 RX ORDER — MULTIVIT-MIN/FERROUS GLUCONATE 9 MG/15 ML
1 LIQUID (ML) ORAL
Qty: 0 | Refills: 0 | DISCHARGE

## 2023-05-02 RX ORDER — BRIMONIDINE TARTRATE 2 MG/MG
1 SOLUTION/ DROPS OPHTHALMIC
Qty: 0 | Refills: 0 | DISCHARGE

## 2023-05-02 RX ORDER — SIMVASTATIN 20 MG/1
1 TABLET, FILM COATED ORAL
Qty: 0 | Refills: 0 | DISCHARGE

## 2023-05-02 RX ORDER — METOPROLOL TARTRATE 50 MG
1 TABLET ORAL
Refills: 0 | DISCHARGE

## 2023-05-02 RX ORDER — BIMATOPROST 0.3 MG/ML
1 SOLUTION/ DROPS OPHTHALMIC
Qty: 0 | Refills: 0 | DISCHARGE

## 2023-05-02 NOTE — ASU PATIENT PROFILE, ADULT - FALL HARM RISK - RISK INTERVENTIONS

## 2023-05-03 ENCOUNTER — TRANSCRIPTION ENCOUNTER (OUTPATIENT)
Age: 88
End: 2023-05-03

## 2023-05-03 ENCOUNTER — NON-APPOINTMENT (OUTPATIENT)
Age: 88
End: 2023-05-03

## 2023-05-03 ENCOUNTER — RESULT REVIEW (OUTPATIENT)
Age: 88
End: 2023-05-03

## 2023-05-03 ENCOUNTER — OUTPATIENT (OUTPATIENT)
Dept: OUTPATIENT SERVICES | Facility: HOSPITAL | Age: 88
LOS: 1 days | End: 2023-05-03
Payer: MEDICARE

## 2023-05-03 VITALS
WEIGHT: 96.56 LBS | TEMPERATURE: 98 F | RESPIRATION RATE: 38 BRPM | SYSTOLIC BLOOD PRESSURE: 128 MMHG | HEART RATE: 77 BPM | HEIGHT: 63 IN | OXYGEN SATURATION: 94 % | DIASTOLIC BLOOD PRESSURE: 101 MMHG

## 2023-05-03 VITALS
SYSTOLIC BLOOD PRESSURE: 128 MMHG | RESPIRATION RATE: 37 BRPM | OXYGEN SATURATION: 97 % | DIASTOLIC BLOOD PRESSURE: 95 MMHG | HEART RATE: 65 BPM

## 2023-05-03 DIAGNOSIS — Z98.890 OTHER SPECIFIED POSTPROCEDURAL STATES: Chronic | ICD-10-CM

## 2023-05-03 DIAGNOSIS — R06.09 OTHER FORMS OF DYSPNEA: ICD-10-CM

## 2023-05-03 DIAGNOSIS — Z98.49 CATARACT EXTRACTION STATUS, UNSPECIFIED EYE: Chronic | ICD-10-CM

## 2023-05-03 DIAGNOSIS — I50.9 HEART FAILURE, UNSPECIFIED: ICD-10-CM

## 2023-05-03 LAB
CULTURE RESULTS: SIGNIFICANT CHANGE UP
SPECIMEN SOURCE: SIGNIFICANT CHANGE UP

## 2023-05-03 PROCEDURE — 32555 ASPIRATE PLEURA W/ IMAGING: CPT | Mod: LT

## 2023-05-03 PROCEDURE — 71045 X-RAY EXAM CHEST 1 VIEW: CPT | Mod: 26

## 2023-05-03 NOTE — PACU DISCHARGE NOTE - COMMENTS
Patient discharged verbally. Educated son on discharge instruction, he verbalized and demonstrated understanding. Discharge witnessed by RN Blanca Mendoza.

## 2023-05-03 NOTE — PROCEDURE NOTE - PROCEDURE FINDINGS AND DETAILS
700cc of yellow pleuritic fluid removed from left thorax under sterile conditions and ultrasound guidance.  Post procedure CXR was ordered.

## 2023-05-03 NOTE — ASU DISCHARGE PLAN (ADULT/PEDIATRIC) - NS MD DC FALL RISK RISK
For information on Fall & Injury Prevention, visit: [email or web address] OR  [email or web address] OR  https://www.cdc.gov/steadi/patient.html

## 2023-05-08 PROCEDURE — 32555 ASPIRATE PLEURA W/ IMAGING: CPT

## 2023-05-08 PROCEDURE — C1729: CPT

## 2023-05-08 PROCEDURE — 71045 X-RAY EXAM CHEST 1 VIEW: CPT

## 2023-05-09 ENCOUNTER — NON-APPOINTMENT (OUTPATIENT)
Age: 88
End: 2023-05-09

## 2023-05-24 RX ORDER — SPIRONOLACTONE 25 MG/1
25 TABLET ORAL DAILY
Qty: 90 | Refills: 0 | Status: ACTIVE | COMMUNITY
Start: 2023-05-03 | End: 1900-01-01

## 2023-06-13 ENCOUNTER — APPOINTMENT (OUTPATIENT)
Dept: HEART FAILURE | Facility: CLINIC | Age: 88
End: 2023-06-13
Payer: MEDICARE

## 2023-06-13 ENCOUNTER — NON-APPOINTMENT (OUTPATIENT)
Age: 88
End: 2023-06-13

## 2023-06-13 VITALS
HEART RATE: 74 BPM | SYSTOLIC BLOOD PRESSURE: 110 MMHG | WEIGHT: 97.25 LBS | HEIGHT: 63 IN | BODY MASS INDEX: 17.23 KG/M2 | DIASTOLIC BLOOD PRESSURE: 62 MMHG | OXYGEN SATURATION: 95 %

## 2023-06-13 DIAGNOSIS — I25.10 ATHEROSCLEROTIC HEART DISEASE OF NATIVE CORONARY ARTERY W/OUT ANGINA PECTORIS: ICD-10-CM

## 2023-06-13 DIAGNOSIS — I07.1 RHEUMATIC TRICUSPID INSUFFICIENCY: ICD-10-CM

## 2023-06-13 DIAGNOSIS — I34.0 NONRHEUMATIC MITRAL (VALVE) INSUFFICIENCY: ICD-10-CM

## 2023-06-13 DIAGNOSIS — I42.8 OTHER CARDIOMYOPATHIES: ICD-10-CM

## 2023-06-13 DIAGNOSIS — I50.1 LEFT VENTRICULAR FAILURE, UNSPECIFIED: ICD-10-CM

## 2023-06-13 DIAGNOSIS — I35.0 NONRHEUMATIC AORTIC (VALVE) STENOSIS: ICD-10-CM

## 2023-06-13 DIAGNOSIS — I50.9 HEART FAILURE, UNSPECIFIED: ICD-10-CM

## 2023-06-13 LAB
ALBUMIN SERPL ELPH-MCNC: 3.9 G/DL
ALP BLD-CCNC: 85 U/L
ALT SERPL-CCNC: 8 U/L
ANION GAP SERPL CALC-SCNC: 15 MMOL/L
AST SERPL-CCNC: 17 U/L
BASOPHILS # BLD AUTO: 0.09 K/UL
BASOPHILS NFR BLD AUTO: 1 %
BILIRUB SERPL-MCNC: 0.3 MG/DL
BUN SERPL-MCNC: 11 MG/DL
CALCIUM SERPL-MCNC: 9.5 MG/DL
CHLORIDE SERPL-SCNC: 100 MMOL/L
CO2 SERPL-SCNC: 24 MMOL/L
CREAT SERPL-MCNC: 0.64 MG/DL
EGFR: 84 ML/MIN/1.73M2
EOSINOPHIL # BLD AUTO: 0.17 K/UL
EOSINOPHIL NFR BLD AUTO: 1.9 %
GLUCOSE SERPL-MCNC: 89 MG/DL
HCT VFR BLD CALC: 45 %
HGB BLD-MCNC: 14.8 G/DL
IMM GRANULOCYTES NFR BLD AUTO: 0.2 %
LYMPHOCYTES # BLD AUTO: 2.75 K/UL
LYMPHOCYTES NFR BLD AUTO: 30 %
MAN DIFF?: NORMAL
MCHC RBC-ENTMCNC: 29.8 PG
MCHC RBC-ENTMCNC: 32.9 GM/DL
MCV RBC AUTO: 90.5 FL
MONOCYTES # BLD AUTO: 0.6 K/UL
MONOCYTES NFR BLD AUTO: 6.5 %
NEUTROPHILS # BLD AUTO: 5.54 K/UL
NEUTROPHILS NFR BLD AUTO: 60.4 %
NT-PROBNP SERPL-MCNC: 4524 PG/ML
PLATELET # BLD AUTO: 174 K/UL
POTASSIUM SERPL-SCNC: 4.6 MMOL/L
PROT SERPL-MCNC: 6.2 G/DL
RBC # BLD: 4.97 M/UL
RBC # FLD: 13.6 %
SODIUM SERPL-SCNC: 139 MMOL/L
WBC # FLD AUTO: 9.17 K/UL

## 2023-06-13 PROCEDURE — 93000 ELECTROCARDIOGRAM COMPLETE: CPT

## 2023-06-13 PROCEDURE — 99214 OFFICE O/P EST MOD 30 MIN: CPT

## 2023-06-13 RX ORDER — METOPROLOL SUCCINATE 25 MG/1
25 TABLET, EXTENDED RELEASE ORAL TWICE DAILY
Refills: 0 | Status: ACTIVE | COMMUNITY

## 2023-06-19 ENCOUNTER — LABORATORY RESULT (OUTPATIENT)
Age: 88
End: 2023-06-19

## 2023-06-21 ENCOUNTER — OFFICE (OUTPATIENT)
Dept: URBAN - METROPOLITAN AREA CLINIC 109 | Facility: CLINIC | Age: 88
Setting detail: OPHTHALMOLOGY
End: 2023-06-21
Payer: MEDICARE

## 2023-06-21 DIAGNOSIS — H16.223: ICD-10-CM

## 2023-06-21 DIAGNOSIS — H35.40: ICD-10-CM

## 2023-06-21 DIAGNOSIS — H40.1132: ICD-10-CM

## 2023-06-21 PROCEDURE — 92020 GONIOSCOPY: CPT | Performed by: OPHTHALMOLOGY

## 2023-06-21 PROCEDURE — 99213 OFFICE O/P EST LOW 20 MIN: CPT | Performed by: OPHTHALMOLOGY

## 2023-06-21 PROCEDURE — 92133 CPTRZD OPH DX IMG PST SGM ON: CPT | Performed by: OPHTHALMOLOGY

## 2023-06-21 PROCEDURE — 92083 EXTENDED VISUAL FIELD XM: CPT | Performed by: OPHTHALMOLOGY

## 2023-06-21 ASSESSMENT — LID EXAM ASSESSMENTS
OD_BLEPHARITIS: RLL RUL 1+
OS_BLEPHARITIS: LLL LUL 1+

## 2023-06-21 ASSESSMENT — REFRACTION_CURRENTRX
OD_ADD: +4.00
OD_CYLINDER: -0.75
OD_AXIS: 113
OS_CYLINDER: -0.75
OD_SPHERE: +0.75
OS_ADD: +4.00
OS_OVR_VA: 20/
OS_AXIS: 75
OD_OVR_VA: 20/
OS_SPHERE: PLANO

## 2023-06-21 ASSESSMENT — SUPERFICIAL PUNCTATE KERATITIS (SPK)
OS_SPK: 3+
OD_SPK: 3+

## 2023-06-21 ASSESSMENT — CONFRONTATIONAL VISUAL FIELD TEST (CVF)
OS_FINDINGS: FULL
OD_FINDINGS: FULL

## 2023-06-21 ASSESSMENT — VISUAL ACUITY
OD_BCVA: 20/50-2
OS_BCVA: 20/50-2

## 2023-06-21 ASSESSMENT — TONOMETRY
OD_IOP_MMHG: 15
OS_IOP_MMHG: 15

## 2023-06-22 ENCOUNTER — NON-APPOINTMENT (OUTPATIENT)
Age: 88
End: 2023-06-22

## 2023-07-30 PROBLEM — I50.1 LEFT HEART FAILURE, NYHA CLASS 3: Status: ACTIVE | Noted: 2023-05-03

## 2023-07-30 PROBLEM — I25.10 ARTERIOSCLEROSIS OF CORONARY ARTERY: Status: ACTIVE | Noted: 2017-11-20

## 2023-07-30 PROBLEM — I07.1 TRICUSPID INSUFFICIENCY: Status: ACTIVE | Noted: 2017-11-20

## 2023-07-30 NOTE — HISTORY OF PRESENT ILLNESS
[FreeTextEntry1] : 89 y/o female with h/o NICMP LVEF 45-->52%, severe MR s/p mitral clip '17 w residual mod-sev MR, AFib s/p ablation (refuses AC), PVD/carotid art stenosis, HTN, DLP, SVT ablation, bladder neoplasm s/p resection, breast cancer lumpectomy+lymph nodes w recurrence s/p chemo x 2, h/o lung cancer (started chemo many years ago but never finished her course) and hypothyroidism who was referred for optimization and consideration of mitral clip. \par \par Ms. Salmeron is accompanied by her son who provides most of the history. They report worsening respiratory symptoms for the past 1-2 months. She's had MALIN, orthopnea and PND. There is no report of LE edema, palpitations, chest pain or dizziness. These symptoms were associated to elevated BP (SBP in the 180s) which has now improved after Toprol was increased. She follows frequently with her PCP. She has not had any recent HF hospitalizations (last hospitalization was many years ago). However, she has required at least one thoracentesis due to pleural effusion. His son believes that she's building fluid again. No recent changes in her weight. \par \par She recently underwent a TTE on 3/2023 which showed LVEF 52%, s/p s/p mitral clip w residual mod-sev MR, severe AS (Vmax 1.7 m/s, peak/mean 12/7mmHg, YOHAN 0.92cm2, SV 40), RV normal size/function, mild-mod TR, RVSP 43mmHg. She was referred to see Dr. Figueroa for her MR. He has referred her to us for optimization prior to consideration. \par \par Since last seen on 4/24 she has undergone bilateral thoracentesis (R on 4/28 with 2L removed and L on 5/3 with 750 cc removed). Her weight increased to 100.8 on 6/2 from 92.6 lbs on 5/18, but she reports having a better appetite after the thoracentesis. Today her weight is 97.8 lbs. Her SBP was in the 90s at times, so her son lowered metoprolol to 25 mg BID. Now her SBP is 110-130s. Walks with walker and denies any SOB, orthopnea, PND, bendopnea, LH/dizziness, CP, or palpitations. No h/o UTI.

## 2023-07-30 NOTE — CARDIOLOGY SUMMARY
[de-identified] : \par 6/13/23 EKG: AFib, 59 bpm.\par 1/3/23 EKG AF, PVC, ?old AS MI. \par  [de-identified] : \par 3/13/23 TTE: LVEF 52% LVIDd 4.8cm, s/p mitral clip w residual mod-sev MR, severe AS (Vmax 1.7 m/s, peak/mean 12/7mmHg, YOHAN 0.92cm2, SV 40), RV normal size/function, mild-mod TR, RVSP 43mmHg. \par  [de-identified] : \par 2017 [facility]: minor luminal irregularities LVEDP 16

## 2023-07-30 NOTE — DISCUSSION/SUMMARY
[EKG obtained to assist in diagnosis and management of assessed problem(s)] : EKG obtained to assist in diagnosis and management of assessed problem(s) [FreeTextEntry1] : 89 y/o female with h/o NICMP LVEF 45-->52%, severe MR s/p mitral clip '17 w residual mod-sev MR, AFib s/p ablation (refuses AC), PVD/carotid art stenosis, HTN, DLP, SVT ablation, bladder neoplasm s/p resection, breast cancer lumpectomy+lymph nodes w recurrence s/p chemo x 2, h/o lung cancer (started chemo many years ago but never finished her course) and hypothyroidism who was referred for optimization and consideration of mitral clip. \par Pt reports NYHA II symptoms, improved after bilateral thoracentesis. She has no LE edema and JVD seems close to normal. Will continue to optimize her GDMT as above and then repeat TTE in 1 month. Should f/u w pulmonology due to recurrent pleural effusion and h/o malignancy. \par Continue shared care with Dr. Wood. Offered HF follow-up in [place] but patient prefers to continue to follow-up with me in [place].\par Plan as above

## 2023-07-30 NOTE — END OF VISIT
[FreeTextEntry3] :  IАнна MD independently performed a history and physical examination of the patient, and formulated the management plan. I have reviewed the note by BRIGITTE Ibarra and agree with the documented findings and plan of care. [Time Spent: ___ minutes] : I have spent [unfilled] minutes of time on the encounter.

## 2023-07-30 NOTE — REASON FOR VISIT
[Other: ____] : [unfilled] [FreeTextEntry3] : Niall Wood and Frantz Figueroa [FreeTextEntry1] : Cards: Say Wood DO\par \par CTS: Frantz Figueroa MD\par \par PCP: Tirso Juarez MD

## 2023-07-30 NOTE — ASSESSMENT
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 NYHA II Clinically seems close to euvolemia w/warm extremities GDMT: Toprol xl 25 mg BID and spironolactone 25 mg daily. Start Farxiga 10 mg daily. Labs: Recent labs from 5/12 showed Na 141, K 4.6, and Cr 0.71. Will order repeat labs next week via home draw after she has started Farxiga (BMP and pro-BNP) HF education provided including lifestyle changes (low Na diet, fluid restriction, increase physical activity), current clinical condition, natural progression and prognosis. I have offered follow up at [facility] with Dr. Fine  # Valvular heart disease MR s/p mitral clip '17 - now w residual MR ??severe AS LFLG as per TTE - doppler data is confusing: Vmax 1.7 m/s, peak/mean 12/7mmHg, YOHAN 0.92cm2, SV 40. Images are not available. Will order repeat TTE in 1 month after she has started Farxiga  #Oncology H/o multiple cancers: thyroid, breast w recurrence (s/p lumpectomy x2, chemo) and untreated lung cancer/nodule Recommend pulmonology follow up for recurrent pleural effusions

## 2023-07-30 NOTE — PHYSICAL EXAM
[Frail] : frail [Cachectic] : cachexia was observed [Murmur] : murmur [Normal] : soft, non-tender, no masses/organomegaly, normal bowel sounds [No Edema] : no edema [No Rash] : no rash [Alert and Oriented] : alert and oriented [Clear Lung Fields] : clear lung fields [Good Air Entry] : good air entry [de-identified] : Pt is on a wheelchair [de-identified] : 2-3/6 holosyst predom at apex [de-identified] : irregularly irregular

## 2023-08-03 ENCOUNTER — APPOINTMENT (OUTPATIENT)
Dept: CARDIOLOGY | Facility: CLINIC | Age: 88
End: 2023-08-03
Payer: MEDICARE

## 2023-08-03 PROCEDURE — 93306 TTE W/DOPPLER COMPLETE: CPT

## 2023-08-07 ENCOUNTER — NON-APPOINTMENT (OUTPATIENT)
Age: 88
End: 2023-08-07

## 2023-08-16 RX ORDER — DAPAGLIFLOZIN 10 MG/1
10 TABLET, FILM COATED ORAL DAILY
Qty: 1 | Refills: 5 | Status: DISCONTINUED | COMMUNITY
Start: 2023-06-13 | End: 2023-08-16

## 2023-10-27 ENCOUNTER — APPOINTMENT (OUTPATIENT)
Dept: HEART FAILURE | Facility: CLINIC | Age: 88
End: 2023-10-27

## 2023-10-27 VITALS
WEIGHT: 103 LBS | OXYGEN SATURATION: 98 % | HEART RATE: 74 BPM | SYSTOLIC BLOOD PRESSURE: 108 MMHG | DIASTOLIC BLOOD PRESSURE: 68 MMHG | BODY MASS INDEX: 18.25 KG/M2 | HEIGHT: 63 IN

## 2023-10-27 DIAGNOSIS — I63.9 CEREBRAL INFARCTION, UNSPECIFIED: ICD-10-CM

## 2023-10-27 PROCEDURE — 99214 OFFICE O/P EST MOD 30 MIN: CPT

## 2023-10-27 RX ORDER — ASPIRIN 325 MG/1
325 TABLET, COATED ORAL
Refills: 0 | Status: DISCONTINUED | COMMUNITY
End: 2023-10-27

## 2023-10-27 RX ORDER — APIXABAN 2.5 MG/1
2.5 TABLET, FILM COATED ORAL TWICE DAILY
Refills: 0 | Status: ACTIVE | COMMUNITY

## 2023-10-27 RX ORDER — APIXABAN 2.5 MG/1
2.5 TABLET, FILM COATED ORAL
Qty: 60 | Refills: 5 | Status: ACTIVE | COMMUNITY
Start: 2023-10-27

## 2023-10-31 NOTE — ASU PREOP CHECKLIST - WEIGHT IN KG
52.2 O-T Advancement Flap Text: The defect edges were debeveled with a #15 scalpel blade.  Given the location of the defect, shape of the defect and the proximity to free margins an O-T advancement flap was deemed most appropriate.  Using a sterile surgical marker, an appropriate advancement flap was drawn incorporating the defect and placing the expected incisions within the relaxed skin tension lines where possible.    The area thus outlined was incised deep to adipose tissue with a #15 scalpel blade.  The skin margins were undermined to an appropriate distance in all directions utilizing iris scissors.

## 2023-11-29 ENCOUNTER — RX RENEWAL (OUTPATIENT)
Age: 88
End: 2023-11-29

## 2023-11-29 RX ORDER — SIMVASTATIN 40 MG/1
40 TABLET, FILM COATED ORAL
Qty: 90 | Refills: 1 | Status: ACTIVE | COMMUNITY
Start: 2023-11-29 | End: 1900-01-01

## 2023-12-13 ENCOUNTER — OFFICE (OUTPATIENT)
Dept: URBAN - METROPOLITAN AREA CLINIC 109 | Facility: CLINIC | Age: 88
Setting detail: OPHTHALMOLOGY
End: 2023-12-13
Payer: MEDICARE

## 2023-12-13 DIAGNOSIS — H40.1132: ICD-10-CM

## 2023-12-13 DIAGNOSIS — H16.223: ICD-10-CM

## 2023-12-13 DIAGNOSIS — H35.40: ICD-10-CM

## 2023-12-13 PROCEDURE — 92250 FUNDUS PHOTOGRAPHY W/I&R: CPT | Performed by: OPHTHALMOLOGY

## 2023-12-13 PROCEDURE — 92083 EXTENDED VISUAL FIELD XM: CPT | Performed by: OPHTHALMOLOGY

## 2023-12-13 PROCEDURE — 99213 OFFICE O/P EST LOW 20 MIN: CPT | Performed by: OPHTHALMOLOGY

## 2023-12-13 ASSESSMENT — REFRACTION_CURRENTRX
OD_CYLINDER: -0.75
OD_OVR_VA: 20/
OS_SPHERE: PLANO
OD_ADD: +4.00
OS_OVR_VA: 20/
OS_AXIS: 75
OD_AXIS: 113
OD_SPHERE: +0.75
OS_CYLINDER: -0.75
OS_ADD: +4.00

## 2023-12-13 ASSESSMENT — LID EXAM ASSESSMENTS
OD_BLEPHARITIS: RLL RUL 1+
OS_BLEPHARITIS: LLL LUL 1+

## 2023-12-13 ASSESSMENT — SUPERFICIAL PUNCTATE KERATITIS (SPK)
OD_SPK: 3+
OS_SPK: 3+

## 2023-12-13 ASSESSMENT — CONFRONTATIONAL VISUAL FIELD TEST (CVF)
OD_FINDINGS: FULL
OS_FINDINGS: FULL

## 2024-05-15 ENCOUNTER — APPOINTMENT (OUTPATIENT)
Dept: OTOLARYNGOLOGY | Facility: CLINIC | Age: 89
End: 2024-05-15

## 2024-06-03 ENCOUNTER — OFFICE (OUTPATIENT)
Dept: URBAN - METROPOLITAN AREA CLINIC 109 | Facility: CLINIC | Age: 89
Setting detail: OPHTHALMOLOGY
End: 2024-06-03
Payer: MEDICARE

## 2024-06-03 DIAGNOSIS — H16.223: ICD-10-CM

## 2024-06-03 DIAGNOSIS — H40.1132: ICD-10-CM

## 2024-06-03 DIAGNOSIS — H10.45: ICD-10-CM

## 2024-06-03 DIAGNOSIS — H02.89: ICD-10-CM

## 2024-06-03 PROCEDURE — 99213 OFFICE O/P EST LOW 20 MIN: CPT | Performed by: OPHTHALMOLOGY

## 2024-06-03 PROCEDURE — 92133 CPTRZD OPH DX IMG PST SGM ON: CPT | Performed by: OPHTHALMOLOGY

## 2024-06-03 PROCEDURE — 92083 EXTENDED VISUAL FIELD XM: CPT | Performed by: OPHTHALMOLOGY

## 2024-06-03 PROCEDURE — 76514 ECHO EXAM OF EYE THICKNESS: CPT | Performed by: OPHTHALMOLOGY

## 2024-06-03 ASSESSMENT — CONFRONTATIONAL VISUAL FIELD TEST (CVF)
OS_FINDINGS: FULL
OD_FINDINGS: FULL

## 2024-06-03 ASSESSMENT — LID EXAM ASSESSMENTS
OD_BLEPHARITIS: RLL RUL 1+
OS_BLEPHARITIS: LLL LUL 1+

## 2024-06-19 ENCOUNTER — RX RENEWAL (OUTPATIENT)
Age: 89
End: 2024-06-19

## 2024-07-15 ENCOUNTER — RX ONLY (RX ONLY)
Age: 89
End: 2024-07-15

## 2024-07-15 ENCOUNTER — OFFICE (OUTPATIENT)
Dept: URBAN - METROPOLITAN AREA CLINIC 109 | Facility: CLINIC | Age: 89
Setting detail: OPHTHALMOLOGY
End: 2024-07-15
Payer: MEDICARE

## 2024-07-15 DIAGNOSIS — H40.1132: ICD-10-CM

## 2024-07-15 PROCEDURE — 99213 OFFICE O/P EST LOW 20 MIN: CPT | Performed by: OPHTHALMOLOGY

## 2024-07-15 ASSESSMENT — LID EXAM ASSESSMENTS
OS_BLEPHARITIS: LLL LUL 1+
OD_BLEPHARITIS: RLL RUL 1+

## 2024-07-15 ASSESSMENT — CONFRONTATIONAL VISUAL FIELD TEST (CVF)
OS_FINDINGS: FULL
OD_FINDINGS: FULL

## 2024-07-25 ENCOUNTER — OFFICE (OUTPATIENT)
Dept: URBAN - METROPOLITAN AREA CLINIC 109 | Facility: CLINIC | Age: 89
Setting detail: OPHTHALMOLOGY
End: 2024-07-25
Payer: MEDICARE

## 2024-07-25 DIAGNOSIS — H52.4: ICD-10-CM

## 2024-07-25 PROCEDURE — 92015 DETERMINE REFRACTIVE STATE: CPT | Performed by: OPTOMETRIST

## 2024-07-25 ASSESSMENT — CONFRONTATIONAL VISUAL FIELD TEST (CVF)
OD_FINDINGS: FULL
OS_FINDINGS: FULL

## 2024-07-25 ASSESSMENT — LID EXAM ASSESSMENTS
OS_BLEPHARITIS: LLL LUL 1+
OD_BLEPHARITIS: RLL RUL 1+

## 2024-08-13 NOTE — ASU PREOP CHECKLIST - IDENTIFICATION BAND VERIFIED
done [FreeTextEntry1] : hld, low back pains, osteopenia, migraines [de-identified] : 69 y/o female with a hx of hld, low back pain, osteopenia, migraines here for f/u Occ strains her back - goes to ortho for injection and muscle relaxant when needed.  No current sx.  No ortho f/u since last time. HAs been okay. Takes lipitor. started on low dose BP med.  with headaches and hypotension on the rx.  Changed to 1/2 pill.  Has been w/o sx.  BP has been controlled.   Diet healthy - some walking for exercise. no c/o. no cv sx.  no neuro sx. with liver cysts - due for f/u.  no sx.  ? due for f/u imaging. with hx op - s/p rx in the past with bisphosphonate.  Recent dexa with osteopenia with hx migraines in the past.  Was treated with initrex.  Has been having occ headaches.  no current c/o  vaccines-  gets flu -  -  pneumo 1/21 - prevnar - 3/6/22 tet - '21 Had covid vaccine and booster - had bivalent 11/19/22.  Had spikevax  colon ~ 11/22 - polyps mammo, gyn - annual.  6/24 Dental and ophtho - up to date.   Dexa 3/23 - osteopenia Ophtho - recent.

## 2024-12-09 ENCOUNTER — OFFICE (OUTPATIENT)
Dept: URBAN - METROPOLITAN AREA CLINIC 109 | Facility: CLINIC | Age: 89
Setting detail: OPHTHALMOLOGY
End: 2024-12-09
Payer: MEDICARE

## 2024-12-09 DIAGNOSIS — H40.1132: ICD-10-CM

## 2024-12-09 PROCEDURE — 92020 GONIOSCOPY: CPT | Performed by: OPHTHALMOLOGY

## 2024-12-09 PROCEDURE — 92012 INTRM OPH EXAM EST PATIENT: CPT | Performed by: OPHTHALMOLOGY

## 2024-12-09 PROCEDURE — 92133 CPTRZD OPH DX IMG PST SGM ON: CPT | Performed by: OPHTHALMOLOGY

## 2024-12-09 PROCEDURE — 92083 EXTENDED VISUAL FIELD XM: CPT | Performed by: OPHTHALMOLOGY

## 2024-12-09 ASSESSMENT — REFRACTION_MANIFEST
OS_CYLINDER: -0.75
OS_AXIS: 095
OD_AXIS: 105
OS_VA1: 20/25
OS_ADD: +2.75
OD_SPHERE: +2.25
OD_ADD: +2.75
OD_CYLINDER: -1.00
OD_VA1: 20/60
OU_VA: 20/25-
OS_SPHERE: +1.00

## 2024-12-09 ASSESSMENT — REFRACTION_AUTOREFRACTION
OS_CYLINDER: -0.75
OS_AXIS: 077
OS_SPHERE: +0.75
OD_CYLINDER: -1.00
OD_SPHERE: +1.00
OD_AXIS: 099

## 2024-12-09 ASSESSMENT — KERATOMETRY
OS_K1POWER_DIOPTERS: 41.25
OS_K2POWER_DIOPTERS: 42.00
OD_AXISANGLE_DEGREES: 143
OD_K1POWER_DIOPTERS: 41.00
OS_AXISANGLE_DEGREES: 150
OD_K2POWER_DIOPTERS: 41.75

## 2024-12-09 ASSESSMENT — REFRACTION_CURRENTRX
OD_OVR_VA: 20/
OS_ADD: +3.25
OD_CYLINDER: -1.00
OS_OVR_VA: 20/
OD_SPHERE: +0.75
OD_AXIS: 105
OS_AXIS: 077
OS_VPRISM_DIRECTION: BF
OD_ADD: +3.25
OS_CYLINDER: -1.00
OS_SPHERE: PLANO
OD_VPRISM_DIRECTION: BF

## 2024-12-09 ASSESSMENT — VISUAL ACUITY
OS_BCVA: 20/60
OD_BCVA: 20/40+2

## 2024-12-09 ASSESSMENT — LID EXAM ASSESSMENTS
OS_BLEPHARITIS: LLL LUL 1+
OD_BLEPHARITIS: RLL RUL 1+

## 2024-12-09 ASSESSMENT — SUPERFICIAL PUNCTATE KERATITIS (SPK)
OS_SPK: 3+
OD_SPK: 3+

## 2024-12-09 ASSESSMENT — TONOMETRY
OS_IOP_MMHG: 17
OD_IOP_MMHG: 16

## 2024-12-09 ASSESSMENT — CONFRONTATIONAL VISUAL FIELD TEST (CVF)
OS_FINDINGS: FULL
OD_FINDINGS: FULL

## 2024-12-09 ASSESSMENT — PACHYMETRY
OD_CT_UM: 638
OS_CT_UM: 625
OS_CT_CORRECTION: -6
OD_CT_CORRECTION: -6

## 2025-05-12 ENCOUNTER — EMERGENCY (EMERGENCY)
Facility: HOSPITAL | Age: 89
LOS: 1 days | End: 2025-05-12
Attending: STUDENT IN AN ORGANIZED HEALTH CARE EDUCATION/TRAINING PROGRAM | Admitting: STUDENT IN AN ORGANIZED HEALTH CARE EDUCATION/TRAINING PROGRAM
Payer: MEDICARE

## 2025-05-12 VITALS
RESPIRATION RATE: 16 BRPM | WEIGHT: 145.06 LBS | TEMPERATURE: 98 F | DIASTOLIC BLOOD PRESSURE: 74 MMHG | SYSTOLIC BLOOD PRESSURE: 127 MMHG | OXYGEN SATURATION: 98 % | HEART RATE: 62 BPM | HEIGHT: 60 IN

## 2025-05-12 DIAGNOSIS — Z98.890 OTHER SPECIFIED POSTPROCEDURAL STATES: Chronic | ICD-10-CM

## 2025-05-12 DIAGNOSIS — Z98.49 CATARACT EXTRACTION STATUS, UNSPECIFIED EYE: Chronic | ICD-10-CM

## 2025-05-12 LAB
ALBUMIN SERPL ELPH-MCNC: 3.5 G/DL — SIGNIFICANT CHANGE UP (ref 3.3–5)
ALP SERPL-CCNC: 78 U/L — SIGNIFICANT CHANGE UP (ref 40–120)
ALT FLD-CCNC: 12 U/L — SIGNIFICANT CHANGE UP (ref 12–78)
ANION GAP SERPL CALC-SCNC: 9 MMOL/L — SIGNIFICANT CHANGE UP (ref 5–17)
APPEARANCE UR: ABNORMAL
APTT BLD: 34.6 SEC — SIGNIFICANT CHANGE UP (ref 26.1–36.8)
AST SERPL-CCNC: 16 U/L — SIGNIFICANT CHANGE UP (ref 15–37)
BACTERIA # UR AUTO: ABNORMAL /HPF
BASOPHILS # BLD AUTO: 0.08 K/UL — SIGNIFICANT CHANGE UP (ref 0–0.2)
BASOPHILS NFR BLD AUTO: 0.8 % — SIGNIFICANT CHANGE UP (ref 0–2)
BILIRUB SERPL-MCNC: 0.5 MG/DL — SIGNIFICANT CHANGE UP (ref 0.2–1.2)
BILIRUB UR-MCNC: NEGATIVE — SIGNIFICANT CHANGE UP
BUN SERPL-MCNC: 13 MG/DL — SIGNIFICANT CHANGE UP (ref 7–23)
CALCIUM SERPL-MCNC: 9.3 MG/DL — SIGNIFICANT CHANGE UP (ref 8.5–10.1)
CHLORIDE SERPL-SCNC: 104 MMOL/L — SIGNIFICANT CHANGE UP (ref 96–108)
CO2 SERPL-SCNC: 26 MMOL/L — SIGNIFICANT CHANGE UP (ref 22–31)
COLOR SPEC: YELLOW — SIGNIFICANT CHANGE UP
COMMENT - URINE: SIGNIFICANT CHANGE UP
CREAT SERPL-MCNC: 1 MG/DL — SIGNIFICANT CHANGE UP (ref 0.5–1.3)
DIFF PNL FLD: NEGATIVE — SIGNIFICANT CHANGE UP
EGFR: 53 ML/MIN/1.73M2 — LOW
EGFR: 53 ML/MIN/1.73M2 — LOW
EOSINOPHIL # BLD AUTO: 0.16 K/UL — SIGNIFICANT CHANGE UP (ref 0–0.5)
EOSINOPHIL NFR BLD AUTO: 1.6 % — SIGNIFICANT CHANGE UP (ref 0–6)
EPI CELLS # UR: PRESENT
GLUCOSE SERPL-MCNC: 101 MG/DL — HIGH (ref 70–99)
GLUCOSE UR QL: NEGATIVE MG/DL — SIGNIFICANT CHANGE UP
HCT VFR BLD CALC: 45 % — SIGNIFICANT CHANGE UP (ref 34.5–45)
HGB BLD-MCNC: 15.1 G/DL — SIGNIFICANT CHANGE UP (ref 11.5–15.5)
IMM GRANULOCYTES NFR BLD AUTO: 0.4 % — SIGNIFICANT CHANGE UP (ref 0–0.9)
INR BLD: 1.23 RATIO — HIGH (ref 0.85–1.16)
KETONES UR QL: NEGATIVE MG/DL — SIGNIFICANT CHANGE UP
LEUKOCYTE ESTERASE UR-ACNC: ABNORMAL
LYMPHOCYTES # BLD AUTO: 2.6 K/UL — SIGNIFICANT CHANGE UP (ref 1–3.3)
LYMPHOCYTES # BLD AUTO: 25.8 % — SIGNIFICANT CHANGE UP (ref 13–44)
MAGNESIUM SERPL-MCNC: 2.1 MG/DL — SIGNIFICANT CHANGE UP (ref 1.6–2.6)
MCHC RBC-ENTMCNC: 30.3 PG — SIGNIFICANT CHANGE UP (ref 27–34)
MCHC RBC-ENTMCNC: 33.6 G/DL — SIGNIFICANT CHANGE UP (ref 32–36)
MCV RBC AUTO: 90.2 FL — SIGNIFICANT CHANGE UP (ref 80–100)
MONOCYTES # BLD AUTO: 0.76 K/UL — SIGNIFICANT CHANGE UP (ref 0–0.9)
MONOCYTES NFR BLD AUTO: 7.6 % — SIGNIFICANT CHANGE UP (ref 2–14)
NEUTROPHILS # BLD AUTO: 6.42 K/UL — SIGNIFICANT CHANGE UP (ref 1.8–7.4)
NEUTROPHILS NFR BLD AUTO: 63.8 % — SIGNIFICANT CHANGE UP (ref 43–77)
NITRITE UR-MCNC: POSITIVE
NRBC BLD AUTO-RTO: 0 /100 WBCS — SIGNIFICANT CHANGE UP (ref 0–0)
PH UR: 7.5 — SIGNIFICANT CHANGE UP (ref 5–8)
PLATELET # BLD AUTO: 170 K/UL — SIGNIFICANT CHANGE UP (ref 150–400)
POTASSIUM SERPL-MCNC: 4.7 MMOL/L — SIGNIFICANT CHANGE UP (ref 3.5–5.3)
POTASSIUM SERPL-SCNC: 4.7 MMOL/L — SIGNIFICANT CHANGE UP (ref 3.5–5.3)
PROT SERPL-MCNC: 7.1 G/DL — SIGNIFICANT CHANGE UP (ref 6–8.3)
PROT UR-MCNC: NEGATIVE MG/DL — SIGNIFICANT CHANGE UP
PROTHROM AB SERPL-ACNC: 14.5 SEC — HIGH (ref 9.9–13.4)
RBC # BLD: 4.99 M/UL — SIGNIFICANT CHANGE UP (ref 3.8–5.2)
RBC # FLD: 13.2 % — SIGNIFICANT CHANGE UP (ref 10.3–14.5)
RBC CASTS # UR COMP ASSIST: 2 /HPF — SIGNIFICANT CHANGE UP (ref 0–4)
SODIUM SERPL-SCNC: 139 MMOL/L — SIGNIFICANT CHANGE UP (ref 135–145)
SP GR SPEC: 1.01 — SIGNIFICANT CHANGE UP (ref 1–1.03)
UROBILINOGEN FLD QL: 1 MG/DL — SIGNIFICANT CHANGE UP (ref 0.2–1)
WBC # BLD: 10.06 K/UL — SIGNIFICANT CHANGE UP (ref 3.8–10.5)
WBC # FLD AUTO: 10.06 K/UL — SIGNIFICANT CHANGE UP (ref 3.8–10.5)
WBC UR QL: 18 /HPF — HIGH (ref 0–5)

## 2025-05-12 PROCEDURE — 85025 COMPLETE CBC W/AUTO DIFF WBC: CPT

## 2025-05-12 PROCEDURE — 71045 X-RAY EXAM CHEST 1 VIEW: CPT

## 2025-05-12 PROCEDURE — 93005 ELECTROCARDIOGRAM TRACING: CPT

## 2025-05-12 PROCEDURE — 99285 EMERGENCY DEPT VISIT HI MDM: CPT | Mod: 25

## 2025-05-12 PROCEDURE — 36415 COLL VENOUS BLD VENIPUNCTURE: CPT

## 2025-05-12 PROCEDURE — 93010 ELECTROCARDIOGRAM REPORT: CPT

## 2025-05-12 PROCEDURE — 99285 EMERGENCY DEPT VISIT HI MDM: CPT

## 2025-05-12 PROCEDURE — 80053 COMPREHEN METABOLIC PANEL: CPT

## 2025-05-12 PROCEDURE — 85610 PROTHROMBIN TIME: CPT

## 2025-05-12 PROCEDURE — 71045 X-RAY EXAM CHEST 1 VIEW: CPT | Mod: 26

## 2025-05-12 PROCEDURE — 81001 URINALYSIS AUTO W/SCOPE: CPT

## 2025-05-12 PROCEDURE — 70450 CT HEAD/BRAIN W/O DYE: CPT | Mod: 26

## 2025-05-12 PROCEDURE — 96374 THER/PROPH/DIAG INJ IV PUSH: CPT

## 2025-05-12 PROCEDURE — 85730 THROMBOPLASTIN TIME PARTIAL: CPT

## 2025-05-12 PROCEDURE — 87077 CULTURE AEROBIC IDENTIFY: CPT

## 2025-05-12 PROCEDURE — 70450 CT HEAD/BRAIN W/O DYE: CPT

## 2025-05-12 PROCEDURE — 87086 URINE CULTURE/COLONY COUNT: CPT

## 2025-05-12 PROCEDURE — 82962 GLUCOSE BLOOD TEST: CPT

## 2025-05-12 PROCEDURE — 83735 ASSAY OF MAGNESIUM: CPT

## 2025-05-12 RX ORDER — CEFTRIAXONE 500 MG/1
1000 INJECTION, POWDER, FOR SOLUTION INTRAMUSCULAR; INTRAVENOUS ONCE
Refills: 0 | Status: COMPLETED | OUTPATIENT
Start: 2025-05-12 | End: 2025-05-12

## 2025-05-12 RX ORDER — CEFPODOXIME PROXETIL 200 MG/1
1 TABLET, FILM COATED ORAL
Qty: 14 | Refills: 0
Start: 2025-05-12 | End: 2025-05-18

## 2025-05-12 RX ADMIN — CEFTRIAXONE 100 MILLIGRAM(S): 500 INJECTION, POWDER, FOR SOLUTION INTRAMUSCULAR; INTRAVENOUS at 23:14

## 2025-05-12 NOTE — ED PROVIDER NOTE - NSFOLLOWUPINSTRUCTIONS_ED_ALL_ED_FT
Please follow up with your Primary Care Physician and any specialists as discussed.  Please take your medications as prescribed and or instructed.  If your symptoms persist or worsen, please seek care. Either return to the Emergency Department, go to urgent care or see your primary care doctor.  Please refer to general information and instructions attached or below:    Urinary Tract Infection, Adult  ImageA urinary tract infection (UTI) is an infection of any part of the urinary tract, which includes the kidneys, ureters, bladder, and urethra. These organs make, store, and get rid of urine in the body. UTI can be a bladder infection (cystitis) or kidney infection (pyelonephritis).    What are the causes?  This infection may be caused by fungi, viruses, or bacteria. Bacteria are the most common cause of UTIs. This condition can also be caused by repeated incomplete emptying of the bladder during urination.    What increases the risk?  This condition is more likely to develop if:    You ignore your need to urinate or hold urine for long periods of time.  You do not empty your bladder completely during urination.  You wipe back to front after urinating or having a bowel movement, if you are female.  You are uncircumcised, if you are male.  You are constipated.  You have a urinary catheter that stays in place (indwelling).  You have a weak defense (immune) system.  You have a medical condition that affects your bowels, kidneys, or bladder.  You have diabetes.  You take antibiotic medicines frequently or for long periods of time, and the antibiotics no longer work well against certain types of infections (antibiotic resistance).  You take medicines that irritate your urinary tract.  You are exposed to chemicals that irritate your urinary tract.  You are female.    What are the signs or symptoms?  Symptoms of this condition include:    Fever.  Frequent urination or passing small amounts of urine frequently.  Needing to urinate urgently.  Pain or burning with urination.  Urine that smells bad or unusual.  Cloudy urine.  Pain in the lower abdomen or back.  Trouble urinating.  Blood in the urine.  Vomiting or being less hungry than normal.  Diarrhea or abdominal pain.  Vaginal discharge, if you are female.    How is this diagnosed?  This condition is diagnosed with a medical history and physical exam. You will also need to provide a urine sample to test your urine. Other tests may be done, including:    Blood tests.  Sexually transmitted disease (STD) testing.    If you have had more than one UTI, a cystoscopy or imaging studies may be done to determine the cause of the infections.    How is this treated?  Treatment for this condition often includes a combination of two or more of the following:    Antibiotic medicine.  Other medicines to treat less common causes of UTI.  Over-the-counter medicines to treat pain.  Drinking enough water to stay hydrated.    Follow these instructions at home:  Take over-the-counter and prescription medicines only as told by your health care provider.  If you were prescribed an antibiotic, take it as told by your health care provider. Do not stop taking the antibiotic even if you start to feel better.  Avoid alcohol, caffeine, tea, and carbonated beverages. They can irritate your bladder.  Drink enough fluid to keep your urine clear or pale yellow.  Keep all follow-up visits as told by your health care provider. This is important.  ImageMake sure to:    Empty your bladder often and completely. Do not hold urine for long periods of time.  Empty your bladder before and after sex.  Wipe from front to back after a bowel movement if you are female. Use each tissue one time when you wipe.    Contact a health care provider if:  You have back pain.  You have a fever.  You feel nauseous or vomit.  Your symptoms do not get better after 3 days.  Your symptoms go away and then return.  Get help right away if:  You have severe back pain or lower abdominal pain.  You are vomiting and cannot keep down any medicines or water.  This information is not intended to replace advice given to you by your health care provider. Make sure you discuss any questions you have with your health care provider.

## 2025-05-12 NOTE — ED PROVIDER NOTE - PHYSICAL EXAMINATION
Vital signs as available reviewed.  General:  No acute distress.  Head:  Normocephalic, atraumatic.  Eyes:  Conjunctiva pink, no icterus.  Cardiovascular:  Regular rate, no obvious murmur.  Respiratory:  Clear to auscultation, good air entry bilaterally.  Abdomen:  Soft, non-tender.  Musculoskeletal:  No obvious deformity.  Neurologic: Alert and oriented, follows commands, extra-occular movements intact, no visual fields defect, no facial asymmetry, no upper extremity drift, no lower extremity drift, rapid alternating movements performed without difficulty, no sensory deficit, no aphasia, no dysarthria, no inattention.   Skin:  Warm and dry.

## 2025-05-12 NOTE — ED ADULT TRIAGE NOTE - CHIEF COMPLAINT QUOTE
Patient brought by ambulance from home as reported having altered mental status noticed by son an hour ago

## 2025-05-12 NOTE — ED ADULT NURSE NOTE - CAS EDP DISCH TYPE
Home

## 2025-05-12 NOTE — ED PROVIDER NOTE - OBJECTIVE STATEMENT
89F history afib on eliquis, hyperlipidemia, cva here after episode of confusion. patient was with son today. 89F history afib on eliquis, hyperlipidemia, cva here after episode of confusion. patient was with son today- was normal today earlier then son noted patient was repeating him 89F history Afib on Eliquis, hyperlipidemia, cva here after episode of confusion. patient was with son today- was normal today earlier then son noted patient was repeating things to him and did not recognize him. no associated facial droop, slurring of speech, 89F history Afib on Eliquis, hyperlipidemia, cva here after episode of confusion. patient was with son today- was normal today earlier then son noted patient was repeating things to him and did not recognize him. no associated facial droop, slurring of speech, numbness / weakness of extremities. Son notes several similar episodes in the past. Was told she had stoke in the past as well as dehydration. No recent illness such as nausea, vomiting, chest pain, shortness of breath, cough.

## 2025-05-12 NOTE — ED ADULT NURSE NOTE - NSFALLHARMRISKINTERV_ED_ALL_ED

## 2025-05-12 NOTE — ED PROVIDER NOTE - CARE PROVIDER_API CALL
Sarah Rogel  Neurology  [address] [place] [address]  Phone: [phone]  Fax: [phone]  Follow Up Time: 4-6 Days

## 2025-05-12 NOTE — ED ADULT NURSE NOTE - OBJECTIVE STATEMENT
Pt presents to the ED via ambulance from home s/p episode in change in mental status. EKG done, pt placed on cardiac monitor, continuous pulse ox. Pt is oriented to self. As per son, pt is back to baseline. Pt presents to the ED via ambulance from home s/p episode in change in mental status. EKG done, pt placed on cardiac monitor, continuous pulse ox. Pt is oriented to self. As per son, pt is back to baseline. Pt presents #20 IV access, noted on the right lower forearm, flushes with ease.

## 2025-05-12 NOTE — ED PROVIDER NOTE - PATIENT PORTAL LINK FT
You can access the FollowMyHealth Patient Portal offered by [facility] by registering at the following website: [email or web address]. By joining [facility] FollowMyHealth portal, you will also be able to view your health information using other applications (apps) compatible with our system.

## 2025-05-13 VITALS
HEART RATE: 76 BPM | OXYGEN SATURATION: 98 % | SYSTOLIC BLOOD PRESSURE: 133 MMHG | RESPIRATION RATE: 18 BRPM | TEMPERATURE: 98 F | DIASTOLIC BLOOD PRESSURE: 90 MMHG

## 2025-05-14 LAB
CULTURE RESULTS: ABNORMAL
SPECIMEN SOURCE: SIGNIFICANT CHANGE UP

## 2025-05-16 NOTE — DISCHARGE NOTE ADULT - MEDICATION SUMMARY - MEDICATIONS TO TAKE
Called to discuss - consistent with primary hyperparathyroidism.  Needs evaluation (probably including 24-h calcium, DEXA). Could do with endocrinologist or with me. She will consider and let me know.    GGT normal. Vitamin D borderline.   I will START or STAY ON the medications listed below when I get home from the hospital:    aspirin 325 mg oral delayed release tablet  -- 1 tab(s) by mouth once a day  -- Indication: For Mitral Clip-  Antiplatelet    Valium 10 mg oral tablet  -- Indication: For Anxiety    simvastatin 40 mg oral tablet  -- 1 tab(s) by mouth once a day (at bedtime)  -- Indication: For Cholesterol    metoprolol succinate 25 mg oral tablet, extended release  -- 1 tab(s) by mouth once a day  -- Indication: For Hypertension    Lasix 20 mg oral tablet  -- 2 tab(s) by mouth once a day  -- Indication: For Diuretic    HepaGam B injectable solution  -- 0.06 mL/kg injectable every 3 weeks  -- Indication: For Neoplasm    Alphagan 0.2% ophthalmic solution  -- 1 drop(s) to each affected eye 2 times a day  -- Indication: For Cataratct surgery    Lumigan 0.01% ophthalmic solution  -- 1 drop(s) to each affected eye once a day (in the evening)  -- Indication: For Cataract surgery

## 2025-06-09 ENCOUNTER — OFFICE (OUTPATIENT)
Dept: URBAN - METROPOLITAN AREA CLINIC 109 | Facility: CLINIC | Age: OVER 89
Setting detail: OPHTHALMOLOGY
End: 2025-06-09
Payer: MEDICARE

## 2025-06-09 DIAGNOSIS — H10.45: ICD-10-CM

## 2025-06-09 DIAGNOSIS — H01.002: ICD-10-CM

## 2025-06-09 DIAGNOSIS — H35.40: ICD-10-CM

## 2025-06-09 DIAGNOSIS — H01.005: ICD-10-CM

## 2025-06-09 DIAGNOSIS — H01.001: ICD-10-CM

## 2025-06-09 DIAGNOSIS — H02.89: ICD-10-CM

## 2025-06-09 DIAGNOSIS — H40.1132: ICD-10-CM

## 2025-06-09 DIAGNOSIS — H01.004: ICD-10-CM

## 2025-06-09 DIAGNOSIS — H16.223: ICD-10-CM

## 2025-06-09 DIAGNOSIS — H43.393: ICD-10-CM

## 2025-06-09 PROCEDURE — 92014 COMPRE OPH EXAM EST PT 1/>: CPT | Performed by: OPHTHALMOLOGY

## 2025-06-09 PROCEDURE — 92250 FUNDUS PHOTOGRAPHY W/I&R: CPT | Performed by: OPHTHALMOLOGY

## 2025-06-09 PROCEDURE — 92083 EXTENDED VISUAL FIELD XM: CPT | Performed by: OPHTHALMOLOGY

## 2025-06-09 ASSESSMENT — REFRACTION_CURRENTRX
OD_SPHERE: +0.75
OS_AXIS: 077
OD_AXIS: 105
OS_OVR_VA: 20/
OS_CYLINDER: -1.00
OD_OVR_VA: 20/
OD_VPRISM_DIRECTION: BF
OS_SPHERE: PLANO
OS_ADD: +3.25
OS_VPRISM_DIRECTION: BF
OD_ADD: +3.25
OD_CYLINDER: -1.00

## 2025-06-09 ASSESSMENT — SUPERFICIAL PUNCTATE KERATITIS (SPK)
OS_SPK: 1+ 2+
OD_SPK: 1+ 2+

## 2025-06-09 ASSESSMENT — REFRACTION_AUTOREFRACTION
OS_CYLINDER: -1.00
OD_SPHERE: +1.50
OS_SPHERE: +1.25
OS_AXIS: 68
OD_AXIS: 101
OD_CYLINDER: -2.00

## 2025-06-09 ASSESSMENT — TONOMETRY
OD_IOP_MMHG: 14
OS_IOP_MMHG: 15

## 2025-06-09 ASSESSMENT — PACHYMETRY
OS_CT_CORRECTION: -6
OS_CT_UM: 625
OD_CT_UM: 638
OD_CT_CORRECTION: -6

## 2025-06-09 ASSESSMENT — REFRACTION_MANIFEST
OD_CYLINDER: -1.00
OS_CYLINDER: -0.75
OD_AXIS: 105
OD_VA1: 20/60
OD_SPHERE: +2.25
OS_SPHERE: +1.00
OS_AXIS: 095
OD_ADD: +2.75
OS_ADD: +2.75
OU_VA: 20/25-
OS_VA1: 20/25

## 2025-06-09 ASSESSMENT — CONFRONTATIONAL VISUAL FIELD TEST (CVF)
OD_FINDINGS: FULL
OS_FINDINGS: FULL

## 2025-06-09 ASSESSMENT — LID EXAM ASSESSMENTS
OD_BLEPHARITIS: RLL RUL 1+
OS_BLEPHARITIS: LLL LUL 1+

## 2025-06-09 ASSESSMENT — VISUAL ACUITY
OS_BCVA: 20/40
OD_BCVA: 20/40+2

## 2025-06-09 ASSESSMENT — KERATOMETRY
OS_K2POWER_DIOPTERS: 42.00
OS_AXISANGLE_DEGREES: 150
OS_K1POWER_DIOPTERS: 41.25
OD_AXISANGLE_DEGREES: 143
OD_K1POWER_DIOPTERS: 41.00
OD_K2POWER_DIOPTERS: 41.75